# Patient Record
Sex: MALE | Race: WHITE | NOT HISPANIC OR LATINO | Employment: FULL TIME | URBAN - NONMETROPOLITAN AREA
[De-identification: names, ages, dates, MRNs, and addresses within clinical notes are randomized per-mention and may not be internally consistent; named-entity substitution may affect disease eponyms.]

---

## 2023-05-03 ENCOUNTER — OFFICE VISIT (OUTPATIENT)
Dept: PRIMARY CARE | Facility: CLINIC | Age: 48
End: 2023-05-03
Payer: COMMERCIAL

## 2023-05-03 VITALS
SYSTOLIC BLOOD PRESSURE: 122 MMHG | BODY MASS INDEX: 31.33 KG/M2 | DIASTOLIC BLOOD PRESSURE: 84 MMHG | HEART RATE: 70 BPM | WEIGHT: 231 LBS | OXYGEN SATURATION: 95 %

## 2023-05-03 DIAGNOSIS — M96.1 LUMBAR POSTLAMINECTOMY SYNDROME: ICD-10-CM

## 2023-05-03 DIAGNOSIS — Z98.1 S/P CERVICAL SPINAL FUSION: ICD-10-CM

## 2023-05-03 DIAGNOSIS — F41.1 GENERALIZED ANXIETY DISORDER: Primary | ICD-10-CM

## 2023-05-03 PROBLEM — J30.9 ALLERGIC RHINITIS: Status: ACTIVE | Noted: 2023-05-03

## 2023-05-03 PROBLEM — F33.8 SEASONAL AFFECTIVE DISORDER (CMS-HCC): Status: ACTIVE | Noted: 2023-05-03

## 2023-05-03 PROBLEM — M25.552 HIP PAIN, BILATERAL: Status: ACTIVE | Noted: 2023-05-03

## 2023-05-03 PROBLEM — M48.061 LUMBAR STENOSIS: Status: ACTIVE | Noted: 2023-05-03

## 2023-05-03 PROBLEM — G89.29 CHRONIC MUSCULOSKELETAL PAIN: Status: ACTIVE | Noted: 2023-05-03

## 2023-05-03 PROBLEM — M54.16 LUMBAR RADICULOPATHY, RIGHT: Status: ACTIVE | Noted: 2023-05-03

## 2023-05-03 PROBLEM — M25.551 HIP PAIN, BILATERAL: Status: ACTIVE | Noted: 2023-05-03

## 2023-05-03 PROBLEM — M25.569 KNEE PAIN: Status: ACTIVE | Noted: 2023-05-03

## 2023-05-03 PROBLEM — K57.90 DIVERTICULOSIS: Status: ACTIVE | Noted: 2023-05-03

## 2023-05-03 PROBLEM — M47.12 CERVICAL SPONDYLOSIS WITH MYELOPATHY: Status: ACTIVE | Noted: 2023-05-03

## 2023-05-03 PROBLEM — L72.9 SCALP CYST: Status: ACTIVE | Noted: 2023-05-03

## 2023-05-03 PROBLEM — M54.12 CERVICAL RADICULOPATHY: Status: ACTIVE | Noted: 2023-05-03

## 2023-05-03 PROBLEM — M16.11 OSTEOARTHRITIS OF RIGHT HIP: Status: ACTIVE | Noted: 2023-05-03

## 2023-05-03 PROBLEM — L72.0 EPIDERMAL INCLUSION CYST: Status: ACTIVE | Noted: 2023-05-03

## 2023-05-03 PROBLEM — L40.9 PSORIASIS: Status: ACTIVE | Noted: 2023-05-03

## 2023-05-03 PROBLEM — M79.18 CHRONIC MUSCULOSKELETAL PAIN: Status: ACTIVE | Noted: 2023-05-03

## 2023-05-03 PROBLEM — M51.26 LUMBAR HERNIATED DISC: Status: ACTIVE | Noted: 2023-05-03

## 2023-05-03 PROBLEM — M54.50 LOWER BACK PAIN: Status: ACTIVE | Noted: 2023-05-03

## 2023-05-03 PROBLEM — Q07.00 ARNOLD-CHIARI SYNDROME WITHOUT SPINA BIFIDA OR HYDROCEPHALUS (MULTI): Status: ACTIVE | Noted: 2023-05-03

## 2023-05-03 PROBLEM — F41.9 ANXIETY DISORDER: Status: ACTIVE | Noted: 2023-05-03

## 2023-05-03 PROBLEM — F17.200 CURRENT SMOKER: Status: ACTIVE | Noted: 2023-05-03

## 2023-05-03 PROBLEM — M46.1 INFLAMMATION OF SACROILIAC JOINT (CMS-HCC): Status: ACTIVE | Noted: 2023-05-03

## 2023-05-03 PROBLEM — M25.50 ARTHRALGIA OF MULTIPLE JOINTS: Status: ACTIVE | Noted: 2023-05-03

## 2023-05-03 PROCEDURE — 3008F BODY MASS INDEX DOCD: CPT | Performed by: FAMILY MEDICINE

## 2023-05-03 PROCEDURE — 99213 OFFICE O/P EST LOW 20 MIN: CPT | Performed by: FAMILY MEDICINE

## 2023-05-03 RX ORDER — CITALOPRAM 20 MG/1
20 TABLET, FILM COATED ORAL DAILY
COMMUNITY
Start: 2019-02-05 | End: 2023-05-03 | Stop reason: DRUGHIGH

## 2023-05-03 RX ORDER — CITALOPRAM 20 MG/1
30 TABLET, FILM COATED ORAL DAILY
Qty: 135 TABLET | Refills: 1 | Status: SHIPPED
Start: 2023-05-03 | End: 2024-01-09 | Stop reason: SDUPTHER

## 2023-05-03 RX ORDER — ARIPIPRAZOLE 5 MG/1
5 TABLET ORAL DAILY
COMMUNITY
Start: 2019-02-28 | End: 2024-01-09 | Stop reason: SDUPTHER

## 2023-05-03 RX ORDER — LORAZEPAM 1 MG/1
1 TABLET ORAL DAILY PRN
COMMUNITY
Start: 2013-11-20 | End: 2023-05-03 | Stop reason: SDUPTHER

## 2023-05-03 RX ORDER — OXYCODONE AND ACETAMINOPHEN 5; 325 MG/1; MG/1
TABLET ORAL
COMMUNITY
End: 2023-05-03 | Stop reason: ALTCHOICE

## 2023-05-03 RX ORDER — CYCLOBENZAPRINE HCL 10 MG
10 TABLET ORAL 3 TIMES DAILY PRN
COMMUNITY
Start: 2022-12-16 | End: 2023-08-07

## 2023-05-03 RX ORDER — LORAZEPAM 1 MG/1
1 TABLET ORAL DAILY PRN
Qty: 110 TABLET | Refills: 0 | Status: SHIPPED | OUTPATIENT
Start: 2023-05-03 | End: 2023-08-07 | Stop reason: SDUPTHER

## 2023-05-03 RX ORDER — OXYCODONE HYDROCHLORIDE 5 MG/1
5 CAPSULE ORAL 3 TIMES DAILY PRN
COMMUNITY
End: 2023-10-24 | Stop reason: SDUPTHER

## 2023-05-03 RX ORDER — NALOXONE HYDROCHLORIDE 4 MG/.1ML
4 SPRAY NASAL AS NEEDED
COMMUNITY
Start: 2021-11-15

## 2023-05-03 ASSESSMENT — ANXIETY QUESTIONNAIRES
IF YOU CHECKED OFF ANY PROBLEMS ON THIS QUESTIONNAIRE, HOW DIFFICULT HAVE THESE PROBLEMS MADE IT FOR YOU TO DO YOUR WORK, TAKE CARE OF THINGS AT HOME, OR GET ALONG WITH OTHER PEOPLE: NOT DIFFICULT AT ALL
1. FEELING NERVOUS, ANXIOUS, OR ON EDGE: NOT AT ALL
6. BECOMING EASILY ANNOYED OR IRRITABLE: NOT AT ALL
7. FEELING AFRAID AS IF SOMETHING AWFUL MIGHT HAPPEN: NOT AT ALL
GAD7 TOTAL SCORE: 1
4. TROUBLE RELAXING: NOT AT ALL
2. NOT BEING ABLE TO STOP OR CONTROL WORRYING: NOT AT ALL
3. WORRYING TOO MUCH ABOUT DIFFERENT THINGS: SEVERAL DAYS
5. BEING SO RESTLESS THAT IT IS HARD TO SIT STILL: NOT AT ALL

## 2023-05-03 NOTE — PATIENT INSTRUCTIONS
Max dose of Tylenol in 24 hours - is   1000 mg up to three times a day     You have been given  prescription(s) for a controlled medication.   This/these medications  can be dangerous if not taken exactly as directed.    You have filled out a controlled medication contract, and it is very important to abide by the contract in order for me to continue to prescribe these medications for you.  You must take these medications as directed, you cannot let anyone else take any of these medications.  You have to keep them in a safe place to protect other people or animals from getting into them.     I cannot refill them if you request a refill earlier than expected.   You cannot get refills for these medications on weekends or after office hours.   You must make sure you have a visit with me every 3 months in order for me to continue to prescribe this/these medications.   Its important you are sure to ask me if you have any questions about our policies on these medications or the medications themselves.    Please bring your bottles of any remaining medications with you to any appointment where you will be needing refills of these medications.     You can be called at any time to come in for a drug screen and/or a pill count if we feel it is necessary.   If we do call you for this, you would need to come into the office within 24 hours of our call.     PLEASE PLAN YOUR NEXT APPOINTMENT WITH ME IN   3 months          ;   ALWAYS BE SURE TO CALL AT LEAST 6 WEEKS AHEAD OF WHEN YOU NEED THE APPOINTMENT TO BE SCHEDULED.      IF I HAVE TOLD YOU TO GET LABS AHEAD OF THE APPOINTMENT WITH ME TO GO OVER TOGETHER AT OUR APPOINTMENT,  BE SURE TO MAKE A LAB APPOINTMENT A FEW DAYS AHEAD OF YOUR VISIT WITH ME, AND LEAVE ME A MESSAGE CLOSE TO THE LAB APPT DATE TO REMIND ME TO PLACE ORDERS FOR THOSE LABS.      THANK YOU!

## 2023-05-03 NOTE — PROGRESS NOTES
Subjective   Patient ID: Abdulaziz Ortez is a 48 y.o. male who presents for Follow-up (3  month med monitoring).    HPI     LOV 2/2023     Did see  new pain doc - to start on plain oxycodone 5 mg -   TID - those will start on the 10th  (replacing oxycodone/apap)         Ears are better     One concern is that he is noticing more swelling of the legs  - pitting edema       Severe generalized anxiety disorder -     Anxiety is doing well -  he was getting Lorazepam #135 for 3 mos - willing to go down to #110       Long history  of anxiety   Taking Lorazepam 1 mg hs and 1/2 in the day a few times a week - HE STATES HE GOES INTO A PANIC ATTACK IF HE SEES HE IS CLOSE TO RUNNING OUT   I have tried to wean down several times - gets MUCH worse when I do -   Also on Citalopram 30 mg a day and Aripiprazole 5 mg a day -   has been on several meds for anxiety in the past - these meds have helped him the most    Has tried propranolol and hydroxyzine - does not do well with either     I have personally reviewed the [place]ARRS report for this person. I find it to be appropriate. I have considered the risk of abuse, dependence, addiction and diversion. I believe that it is clinically appropriate for this person to be prescribed this medication for the documented diagnoses. OARRS report is in chart    Overdose risk score of 210  Contract - update 7/26/22 - updated today - electronic 5/3/23  UDS - - appropriate 7/12/22 - done with pain management (will not perform again due to cost)     JANETTE - 7 - 1 only 5/3/23    We have discussed the great risk of narcotics with benzos       For herniated disc and DDD of spine -   DR Riley - partial C3 laminectomy and C4 - 6 laminoplasty on 12/11/2020 12/16/22 - Lumbar L3- decompressive laminectomy    Has significant chronic pain upper back and low back   CANNOT HAVE STEROIDS - TERRIBLE REACTION IN THE PAST -psychiatric    (will not even try topical - OTC cortisone even effects him)     trying to eat a low  amount of sugar and gluten             Pain Management - seeing DR Gale  Use to see  DR Angie Tavares - pain management   Did see DR Knight ([facility]) - she does not feel he has psoriatic arthritis     smoking - down to 3 - 4  cig a day     Declines vaccines despite my continued encouragement         Review of Systems    Objective   /84 (BP Location: Right arm, Patient Position: Sitting, BP Cuff Size: Large adult)   Pulse 70   Wt 105 kg (231 lb)   SpO2 95%   BMI 31.33 kg/m²     Physical Exam  Vitals reviewed.   Constitutional:       General: He is not in acute distress.     Appearance: Normal appearance. He is obese.   HENT:      Head: Normocephalic and atraumatic.      Nose: Nose normal.      Mouth/Throat:      Mouth: Mucous membranes are moist.      Pharynx: No posterior oropharyngeal erythema.   Eyes:      Extraocular Movements: Extraocular movements intact.      Conjunctiva/sclera: Conjunctivae normal.      Pupils: Pupils are equal, round, and reactive to light.   Cardiovascular:      Rate and Rhythm: Normal rate and regular rhythm.      Heart sounds: Normal heart sounds. No murmur heard.  Pulmonary:      Effort: Pulmonary effort is normal. No respiratory distress.      Breath sounds: Normal breath sounds. No wheezing.   Musculoskeletal:      Cervical back: Neck supple.      Comments: Very slow gait    Lymphadenopathy:      Cervical: No cervical adenopathy.   Skin:     General: Skin is warm and dry.      Findings: No rash.   Neurological:      General: No focal deficit present.      Mental Status: He is alert.   Psychiatric:         Mood and Affect: Mood normal.         Thought Content: Thought content normal.         Assessment/Plan   Problem List Items Addressed This Visit          Other    Anxiety disorder - Primary    Relevant Medications    LORazepam (Ativan) 1 mg tablet     Doing well - no changes - but going to decrease amount of Lorazepam for 3 mos to #110  -     Urged smoking cessation again      Working with pain management

## 2023-05-05 ENCOUNTER — DOCUMENTATION (OUTPATIENT)
Dept: PRIMARY CARE | Facility: CLINIC | Age: 48
End: 2023-05-05
Payer: COMMERCIAL

## 2023-05-05 NOTE — PROGRESS NOTES
Pt had mentioned swelling of legs - altaf left when he was here for an appt  -     I did not address    I called him tonight -   Could be from sodium intake or NSAIDS  If pain when grabs calf - could be DVT - we need to know asap   If better by AM  - reassuring - may need support stockings     Asked him to think on those things and let me know

## 2023-08-03 ENCOUNTER — APPOINTMENT (OUTPATIENT)
Dept: PRIMARY CARE | Facility: CLINIC | Age: 48
End: 2023-08-03
Payer: COMMERCIAL

## 2023-08-07 ENCOUNTER — TELEPHONE (OUTPATIENT)
Dept: PRIMARY CARE | Facility: CLINIC | Age: 48
End: 2023-08-07

## 2023-08-07 ENCOUNTER — OFFICE VISIT (OUTPATIENT)
Dept: PRIMARY CARE | Facility: CLINIC | Age: 48
End: 2023-08-07
Payer: COMMERCIAL

## 2023-08-07 VITALS
BODY MASS INDEX: 32.14 KG/M2 | HEART RATE: 88 BPM | SYSTOLIC BLOOD PRESSURE: 130 MMHG | OXYGEN SATURATION: 95 % | WEIGHT: 237 LBS | DIASTOLIC BLOOD PRESSURE: 92 MMHG

## 2023-08-07 DIAGNOSIS — R39.9 LOWER URINARY TRACT SYMPTOMS (LUTS): ICD-10-CM

## 2023-08-07 DIAGNOSIS — R60.9 EDEMA, UNSPECIFIED TYPE: Primary | ICD-10-CM

## 2023-08-07 DIAGNOSIS — F41.1 GENERALIZED ANXIETY DISORDER: ICD-10-CM

## 2023-08-07 DIAGNOSIS — R06.09 DYSPNEA ON EXERTION: ICD-10-CM

## 2023-08-07 PROBLEM — R53.83 OTHER FATIGUE: Status: ACTIVE | Noted: 2023-08-07

## 2023-08-07 PROBLEM — M54.2 NECK PAIN: Status: ACTIVE | Noted: 2023-08-07

## 2023-08-07 PROBLEM — M47.816 LUMBAR FACET ARTHROPATHY: Status: ACTIVE | Noted: 2023-08-07

## 2023-08-07 LAB
ALANINE AMINOTRANSFERASE (SGPT) (U/L) IN SER/PLAS: 23 U/L (ref 10–52)
ALBUMIN (G/DL) IN SER/PLAS: 4.1 G/DL (ref 3.4–5)
ALKALINE PHOSPHATASE (U/L) IN SER/PLAS: 64 U/L (ref 33–120)
ANION GAP IN SER/PLAS: 11 MMOL/L (ref 10–20)
ASPARTATE AMINOTRANSFERASE (SGOT) (U/L) IN SER/PLAS: 16 U/L (ref 9–39)
BASOPHILS (10*3/UL) IN BLOOD BY AUTOMATED COUNT: 0.09 X10E9/L (ref 0–0.1)
BASOPHILS/100 LEUKOCYTES IN BLOOD BY AUTOMATED COUNT: 1 % (ref 0–2)
BILIRUBIN TOTAL (MG/DL) IN SER/PLAS: 0.5 MG/DL (ref 0–1.2)
CALCIUM (MG/DL) IN SER/PLAS: 9.1 MG/DL (ref 8.6–10.3)
CARBON DIOXIDE, TOTAL (MMOL/L) IN SER/PLAS: 29 MMOL/L (ref 21–32)
CHLORIDE (MMOL/L) IN SER/PLAS: 101 MMOL/L (ref 98–107)
CREATININE (MG/DL) IN SER/PLAS: 1 MG/DL (ref 0.5–1.3)
EOSINOPHILS (10*3/UL) IN BLOOD BY AUTOMATED COUNT: 0.23 X10E9/L (ref 0–0.7)
EOSINOPHILS/100 LEUKOCYTES IN BLOOD BY AUTOMATED COUNT: 2.5 % (ref 0–6)
ERYTHROCYTE DISTRIBUTION WIDTH (RATIO) BY AUTOMATED COUNT: 13.9 % (ref 11.5–14.5)
ERYTHROCYTE MEAN CORPUSCULAR HEMOGLOBIN CONCENTRATION (G/DL) BY AUTOMATED: 32.7 G/DL (ref 32–36)
ERYTHROCYTE MEAN CORPUSCULAR VOLUME (FL) BY AUTOMATED COUNT: 89 FL (ref 80–100)
ERYTHROCYTES (10*6/UL) IN BLOOD BY AUTOMATED COUNT: 5.4 X10E12/L (ref 4.5–5.9)
GFR MALE: >90 ML/MIN/1.73M2
GLUCOSE (MG/DL) IN SER/PLAS: 76 MG/DL (ref 74–99)
HEMATOCRIT (%) IN BLOOD BY AUTOMATED COUNT: 48 % (ref 41–52)
HEMOGLOBIN (G/DL) IN BLOOD: 15.7 G/DL (ref 13.5–17.5)
IMMATURE GRANULOCYTES/100 LEUKOCYTES IN BLOOD BY AUTOMATED COUNT: 0.4 % (ref 0–0.9)
LEUKOCYTES (10*3/UL) IN BLOOD BY AUTOMATED COUNT: 9.4 X10E9/L (ref 4.4–11.3)
LYMPHOCYTES (10*3/UL) IN BLOOD BY AUTOMATED COUNT: 2.53 X10E9/L (ref 1.2–4.8)
LYMPHOCYTES/100 LEUKOCYTES IN BLOOD BY AUTOMATED COUNT: 27.1 % (ref 13–44)
MONOCYTES (10*3/UL) IN BLOOD BY AUTOMATED COUNT: 0.5 X10E9/L (ref 0.1–1)
MONOCYTES/100 LEUKOCYTES IN BLOOD BY AUTOMATED COUNT: 5.3 % (ref 2–10)
NEUTROPHILS (10*3/UL) IN BLOOD BY AUTOMATED COUNT: 5.96 X10E9/L (ref 1.2–7.7)
NEUTROPHILS/100 LEUKOCYTES IN BLOOD BY AUTOMATED COUNT: 63.7 % (ref 40–80)
PLATELETS (10*3/UL) IN BLOOD AUTOMATED COUNT: 205 X10E9/L (ref 150–450)
POC APPEARANCE, URINE: CLEAR
POC BILIRUBIN, URINE: NEGATIVE
POC BLOOD, URINE: NEGATIVE
POC COLOR, URINE: YELLOW
POC GLUCOSE, URINE: NEGATIVE MG/DL
POC KETONES, URINE: NEGATIVE MG/DL
POC LEUKOCYTES, URINE: NEGATIVE
POC NITRITE,URINE: NEGATIVE
POC PH, URINE: 6 PH
POC PROTEIN, URINE: NEGATIVE MG/DL
POC SPECIFIC GRAVITY, URINE: 1.01
POC UROBILINOGEN, URINE: 0.2 EU/DL
POTASSIUM (MMOL/L) IN SER/PLAS: 4.2 MMOL/L (ref 3.5–5.3)
PROTEIN TOTAL: 7.1 G/DL (ref 6.4–8.2)
SODIUM (MMOL/L) IN SER/PLAS: 137 MMOL/L (ref 136–145)
THYROTROPIN (MIU/L) IN SER/PLAS BY DETECTION LIMIT <= 0.05 MIU/L: 1.57 MIU/L (ref 0.44–3.98)
UREA NITROGEN (MG/DL) IN SER/PLAS: 11 MG/DL (ref 6–23)

## 2023-08-07 PROCEDURE — 81003 URINALYSIS AUTO W/O SCOPE: CPT | Performed by: FAMILY MEDICINE

## 2023-08-07 PROCEDURE — 84443 ASSAY THYROID STIM HORMONE: CPT

## 2023-08-07 PROCEDURE — 80053 COMPREHEN METABOLIC PANEL: CPT

## 2023-08-07 PROCEDURE — 84153 ASSAY OF PSA TOTAL: CPT

## 2023-08-07 PROCEDURE — 99214 OFFICE O/P EST MOD 30 MIN: CPT | Performed by: FAMILY MEDICINE

## 2023-08-07 PROCEDURE — 3008F BODY MASS INDEX DOCD: CPT | Performed by: FAMILY MEDICINE

## 2023-08-07 PROCEDURE — 85025 COMPLETE CBC W/AUTO DIFF WBC: CPT

## 2023-08-07 RX ORDER — LORAZEPAM 1 MG/1
TABLET ORAL
Qty: 110 TABLET | Refills: 0 | Status: SHIPPED | OUTPATIENT
Start: 2023-08-07 | End: 2023-11-14 | Stop reason: SDUPTHER

## 2023-08-07 RX ORDER — TOPIRAMATE 25 MG/1
TABLET ORAL
COMMUNITY
Start: 2023-07-31 | End: 2023-08-07

## 2023-08-07 NOTE — PATIENT INSTRUCTIONS
Keep track of your BP - keep a log book    Make appt for an echo -   call [phone]     Plan a phone appt to be done one week after echo    ON that appt will go over Echo and BP to determine if we need to place you on BP medication  - and that may help swelling too       Keep working on quitting smoking       Keep legs as best you can  -     Try support stockings too       PLEASE PLAN YOUR NEXT APPOINTMENT WITH ME IN      3   months        ;   ALWAYS BE SURE TO CALL AT LEAST 6 WEEKS AHEAD OF WHEN YOU NEED THE APPOINTMENT TO BE SCHEDULED.      IF I HAVE TOLD YOU TO GET LABS AHEAD OF THE APPOINTMENT WITH ME TO GO OVER TOGETHER AT OUR APPOINTMENT,  BE SURE TO MAKE A LAB APPOINTMENT A FEW DAYS AHEAD OF YOUR VISIT WITH ME, AND LEAVE ME A MESSAGE CLOSE TO THE LAB APPT DATE TO REMIND ME TO PLACE ORDERS FOR THOSE LABS.      THANK YOU!

## 2023-08-07 NOTE — TELEPHONE ENCOUNTER
SPOKE TO Madina -     Told her he did discuss with me that he is changing from [facility] to [facility] due to [facility] will not fill narcotic for him     He should have enough until Friday - she will call and talk to him

## 2023-08-07 NOTE — TELEPHONE ENCOUNTER
DEVIN FROM [facility] WOULD LIKE YOU TO CALL HER.  SHE SHOWS IN OARRS HE IS GETTING LORAZAPM AT ANOTHER PHARMACY ALSO. HE IS EARLY TO FILL IT NOW.  PLEASE CALL HER AT   [phone] EXT 1.  ARE YOU AWARE THAT HE IS GETTING IT ELSEWHERE ALSO? SHOULD SHE GO AHEAD AND FILL EVEN THOUGH HE IS EARLY?

## 2023-08-07 NOTE — PROGRESS NOTES
"Subjective   Patient ID: Abdulaziz Ortez is a 48 y.o. male who presents for Follow-up (Med follow up.).    HPI     LOV 5/2023      Seeing new pain doc -   To have a trial inj of lidocaine only  -   To locate area of pain  - to possibly have \"nerves burned\"     He was given Topirimate - will not take it     Has him on pain oxycodone 5 mg - with tylenol TID           Swelling of the ankles - better by AM -                   Terrible by the end of the day  - pitting edema there by the end of the day   Not on NSAIDS     Has MOJICA     Urinating every hour  - one year   Does have dribbling at times  No dysuria   Nocturia 1 -2  times has    Drinking a lot of water -  4 - 5   16 oz bottles a day   2 cups of coffee in the AM       BP at home 128 /80s        Severe generalized anxiety disorder -      Anxiety is doing well -  he was getting Lorazepam #110 for  the last 3 mos -  would like to get this at [facility]  - does not want to go lower yet         Long history  of anxiety   Taking Lorazepam 1 mg hs and 1/2 in the day a few times a week - HE STATES HE GOES INTO A PANIC ATTACK IF HE SEES HE IS CLOSE TO RUNNING OUT   I have tried to wean down several times - gets MUCH worse when I do -   Also on Citalopram 30 mg a day and Aripiprazole 5 mg a day -   has been on several meds for anxiety in the past - these meds have helped him the most     Has tried propranolol and hydroxyzine - does not do well with either      I have personally reviewed the OARRS report for this person. I find it to be appropriate.   I have considered the risk of abuse, dependence, addiction and diversion.  I believe that it is clinically appropriate for this person to be prescribed this medication for the documented diagnoses.   OARRS report was reviewed on any day a prescription is written for a controlled substance.        Overdose risk score of 210  Contract - updated - electronic 5/3/23  UDS  - done with pain management (will not perform again due to " cost)      JANETTE - 7 - 1 only 5/3/23     We have discussed the great risk of narcotics with benzos         For herniated disc and DDD of spine -   DR Riley - partial C3 laminectomy and C4 - 6 laminoplasty on 12/11/2020 12/16/22 - Lumbar L3- decompressive laminectomy     Has significant chronic pain upper back and low back   CANNOT HAVE STEROIDS - TERRIBLE REACTION IN THE PAST -psychiatric     (will not even try topical - OTC cortisone even effects him)      trying to eat a low amount of sugar and gluten              Pain Management - seeing DR Gale  Use to see  DR Angie Tavares - pain management   Did see DR Knight ([facility]) - she does not feel he has psoriatic arthritis      smoking - down to    5  cig a day      Declines vaccines despite my continued encouragement                 Review of Systems    Objective   BP (!) 130/92 (BP Location: Right arm, Patient Position: Sitting)   Pulse 88   Wt 108 kg (237 lb)   SpO2 95%   BMI 32.14 kg/m²     Physical Exam  Vitals reviewed.   Constitutional:       General: He is not in acute distress.     Appearance: Normal appearance. He is obese. He is not ill-appearing, toxic-appearing or diaphoretic.   HENT:      Head: Normocephalic and atraumatic.   Cardiovascular:      Rate and Rhythm: Normal rate and regular rhythm.      Heart sounds: Normal heart sounds. No murmur heard.     No friction rub.   Pulmonary:      Effort: Pulmonary effort is normal.      Breath sounds: Normal breath sounds.   Musculoskeletal:         General: Swelling (1 plus edema ankles - no calf tenderness) present.   Neurological:      General: No focal deficit present.      Mental Status: He is alert and oriented to person, place, and time.         Assessment/Plan   Problem List Items Addressed This Visit          High    Anxiety disorder    Relevant Medications    LORazepam (Ativan) 1 mg tablet     Other Visit Diagnoses       Edema, unspecified type    -  Primary    Relevant Orders    CBC and Auto  Differential    TSH with reflex to Free T4 if abnormal    Transthoracic Echo (TTE) Complete    Dyspnea on exertion        Relevant Orders    Transthoracic Echo (TTE) Complete    Lower urinary tract symptoms (LUTS)        Relevant Orders    Comprehensive Metabolic Panel    POCT UA Automated manually resulted    Prostate Specific Antigen          Long history of anxiety - see above - Lorazepam refilled today    Seeing pain management     Very aware of risk of Benzo and Opioids     Issue today is the swelling of the legs -   Discussed that its likely venous insuff - and explained that -   But with MOJICA - check on Echo - gave order    And having LUTS - check labs and urine     BP running high -  to check at home    Agreed to phone appt to be done after echo -   Considering Lisinopril-hydrochlorothiazide for him     We discussed at visit any disease processes that were of concern as well as the risks, benefits and instructions of any new medication provided.    See orders and discussion section for information handed to patient on their Clinical Summary.   Patient (and/or caretaker of patient if present)  stated all questions were answered, and they voiced understanding of instructions.

## 2023-08-08 LAB — PROSTATE SPECIFIC AG (NG/ML) IN SER/PLAS: 0.89 NG/ML (ref 0–4)

## 2023-08-17 ENCOUNTER — HOSPITAL ENCOUNTER (OUTPATIENT)
Dept: DATA CONVERSION | Facility: HOSPITAL | Age: 48
End: 2023-08-17
Attending: ANESTHESIOLOGY | Admitting: ANESTHESIOLOGY
Payer: COMMERCIAL

## 2023-08-17 DIAGNOSIS — M47.816 SPONDYLOSIS WITHOUT MYELOPATHY OR RADICULOPATHY, LUMBAR REGION: ICD-10-CM

## 2023-08-29 ENCOUNTER — TELEMEDICINE (OUTPATIENT)
Dept: PRIMARY CARE | Facility: CLINIC | Age: 48
End: 2023-08-29
Payer: COMMERCIAL

## 2023-08-29 DIAGNOSIS — I10 BENIGN ESSENTIAL HYPERTENSION: Primary | ICD-10-CM

## 2023-08-29 PROCEDURE — 99212 OFFICE O/P EST SF 10 MIN: CPT | Performed by: FAMILY MEDICINE

## 2023-08-29 RX ORDER — LISINOPRIL AND HYDROCHLOROTHIAZIDE 10; 12.5 MG/1; MG/1
1 TABLET ORAL DAILY
Qty: 30 TABLET | Refills: 2 | Status: SHIPPED | OUTPATIENT
Start: 2023-08-29 | End: 2023-11-14

## 2023-08-29 NOTE — PROGRESS NOTES
Subjective   Patient ID: Abdulaziz Ortez is a 48 y.o. male who presents for Follow-up.    HPI     Virtual or Telephone Consent    A telephone visit (audio only) between the patient (at the originating site) and the provider (at the distant site) was utilized to provide this telehealth service.   Verbal consent was requested and obtained from Abdulaziz Ortez on this date, 08/30/23 for a telehealth visit.        Reviewed Echo - WNL     Bps at home -  130 - 140s    Edema a little better when cooler     Review of Systems    Objective   There were no vitals taken for this visit.    Physical Exam    NAD     Assessment/Plan   Problem List Items Addressed This Visit    None  Visit Diagnoses       Benign essential hypertension    -  Primary    Relevant Medications    lisinopriL-hydrochlorothiazide 10-12.5 mg tablet          At last appt we had discussed starting BP med -   Will do today -   Education on MED R and B     Take 1/2 if lightheaded     Pt agreed

## 2023-10-01 NOTE — OP NOTE
Post Operative Note:     PreOp Diagnosis: Lumbar facet arthropathy   Post-Procedure Diagnosis: Lumbar facet arthropathy   Procedure: 1.  Bilateral L3, L4, L5 medial branch  nerve block  2.   3.   4.   5.   Surgeon: Nicola Gale   Resident/Fellow/Other Assistant: Del Galarza   Estimated Blood Loss (mL): none   Specimen: no   Findings: N/A     Operative Report Dictated:  Dictation: not applicable - note contains Operative  Report   Operative Report:    After informed consent was obtained, the patient was brought to the procedure room and placed in the prone position.  The back area was prepped and draped in the  usual sterile fashion.  Using fluoroscopic guidance, skin and subcutaneous tissue overlying the needle trajectories to the target sites were anesthetized with 0.5% lidocaine.  25-gauge spinal needles were advanced under fluoroscopic guidance to the appropriate  anatomic landmarks.  Needle tip position was confirmed in AP and lateral views initially on the right side, then on the left side.  Injection of small amount of Omnipaque contrast at each needle tip revealed appropriate spread without vascular uptake.   Subsequently, 0.5 mL of 0.5% ropivacaine was injected at each needle tip.  The needles were removed.  The patient was then transferred to the recovery room in stable condition.  The patient will be assessed in recovery area for pain relief.     Postprocedure note  Patient had minimal relief with flexion extension and rotation after the injection.     FOLLOW UP:  Should the patient have pain relief, the patient may be a candidate for radiofrequency lesioning, one side at a time.  The patient agrees to continue currently prescribed/recommended therapies.    Attestation:   Note Completion:  I am a: Resident/Fellow   Attending Attestation I was present for the entire procedure         Electronic Signatures:  Nicola Gale)  (Signed 17-Aug-2023 12:23)   Authored: Post Operative  Note   Co-Signer: Post Operative Note, Note Completion  Del Galarza (MD (Fellow))  (Signed 17-Aug-2023 11:29)   Authored: Post Operative Note, Note Completion      Last Updated: 17-Aug-2023 12:23 by Nicola Gale)

## 2023-10-22 PROBLEM — M16.12 PRIMARY OSTEOARTHRITIS OF LEFT HIP: Status: ACTIVE | Noted: 2023-10-22

## 2023-10-22 PROBLEM — Z79.891 CHRONIC USE OF OPIATE DRUG FOR THERAPEUTIC PURPOSE: Status: ACTIVE | Noted: 2023-10-22

## 2023-10-22 PROBLEM — E66.9 CLASS 1 OBESITY WITH BODY MASS INDEX (BMI) OF 30.0 TO 30.9 IN ADULT: Status: ACTIVE | Noted: 2023-10-22

## 2023-10-22 PROBLEM — E66.811 CLASS 1 OBESITY WITH BODY MASS INDEX (BMI) OF 30.0 TO 30.9 IN ADULT: Status: ACTIVE | Noted: 2023-10-22

## 2023-10-22 PROBLEM — C44.42 SQUAMOUS CELL CARCINOMA OF SKIN OF SCALP AND NECK: Status: ACTIVE | Noted: 2021-11-01

## 2023-10-22 PROBLEM — R06.00 DYSPNEA: Status: ACTIVE | Noted: 2023-10-22

## 2023-10-22 PROBLEM — R60.9 EDEMA: Status: ACTIVE | Noted: 2023-10-22

## 2023-10-22 RX ORDER — CHLORHEXIDINE GLUCONATE ORAL RINSE 1.2 MG/ML
15 SOLUTION DENTAL DAILY
COMMUNITY

## 2023-10-22 RX ORDER — TOPIRAMATE 25 MG/1
TABLET ORAL
COMMUNITY
End: 2023-11-14

## 2023-10-24 ENCOUNTER — OFFICE VISIT (OUTPATIENT)
Dept: PAIN MEDICINE | Facility: CLINIC | Age: 48
End: 2023-10-24
Payer: COMMERCIAL

## 2023-10-24 VITALS — SYSTOLIC BLOOD PRESSURE: 153 MMHG | DIASTOLIC BLOOD PRESSURE: 104 MMHG | HEART RATE: 102 BPM | RESPIRATION RATE: 18 BRPM

## 2023-10-24 DIAGNOSIS — M47.12 CERVICAL SPONDYLOSIS WITH MYELOPATHY: ICD-10-CM

## 2023-10-24 PROCEDURE — 99213 OFFICE O/P EST LOW 20 MIN: CPT | Performed by: ANESTHESIOLOGY

## 2023-10-24 PROCEDURE — 3008F BODY MASS INDEX DOCD: CPT | Performed by: ANESTHESIOLOGY

## 2023-10-24 RX ORDER — OXYCODONE HYDROCHLORIDE 5 MG/1
5 TABLET ORAL 3 TIMES DAILY PRN
Qty: 84 TABLET | Refills: 0 | Status: SHIPPED | OUTPATIENT
Start: 2024-01-11 | End: 2024-01-23 | Stop reason: SDUPTHER

## 2023-10-24 RX ORDER — OXYCODONE HYDROCHLORIDE 5 MG/1
5 TABLET ORAL 3 TIMES DAILY PRN
Qty: 84 TABLET | Refills: 0 | Status: SHIPPED | OUTPATIENT
Start: 2023-12-12 | End: 2023-10-25 | Stop reason: SDUPTHER

## 2023-10-24 RX ORDER — OXYCODONE HYDROCHLORIDE 5 MG/1
5 CAPSULE ORAL 3 TIMES DAILY PRN
Qty: 84 CAPSULE | Refills: 0 | Status: SHIPPED | OUTPATIENT
Start: 2023-11-12 | End: 2023-10-25 | Stop reason: CLARIF

## 2023-10-24 ASSESSMENT — PAIN SCALES - GENERAL
PAINLEVEL_OUTOF10: 5 - MODERATE PAIN
PAINLEVEL: 5

## 2023-10-24 ASSESSMENT — LIFESTYLE VARIABLES: TOTAL SCORE: 1

## 2023-10-24 ASSESSMENT — ENCOUNTER SYMPTOMS
DEPRESSION: 0
LOSS OF SENSATION IN FEET: 0
OCCASIONAL FEELINGS OF UNSTEADINESS: 0

## 2023-10-24 ASSESSMENT — PAIN - FUNCTIONAL ASSESSMENT: PAIN_FUNCTIONAL_ASSESSMENT: 0-10

## 2023-10-24 ASSESSMENT — PAIN DESCRIPTION - DESCRIPTORS: DESCRIPTORS: SHARP;SHOOTING

## 2023-10-24 NOTE — PROGRESS NOTES
Subjective   Patient ID: Abdulaziz Ortez is a 48 y.o. male.    HPI  Patient is a 39 [place] male who presents as a follow-up visit in pain clinic today for neck and back pain.  Patient states that the neck pain is associated with shooting stabbing pain down both arms into both hands.  This is aggravated at work when he is reaching above his head.  He also endorses axial low back pain with shooting pain down both anterior thighs that is associated with a heaviness feeling.     Review of Systems  A 13 point comprehensive review of system was negative except for specific complaints as listed in the HPI.    Objective   Physical Exam  PHYSICAL EXAM  Vitals signs reviewed  Constitutional:    General: Not in acute distress   Appearance: Normal appearance. Not ill-appearing.  HENT:   Head: Normocephalic and atraumatic  Eyes:   Conjunctiva/sclera normal  Cardiovascular:  No jugular venous distention bilaterally  No gross edema in lower extremities  Pulmonary:   Effort: No respiratory distress  Abdominal:  Abdomen appears nondistended  Musculoskeletal:   Moves all extremities equally  Skin:   General: Skin is warm and dry  Neurological:  Stern was negative bilaterally  Spurling sign was positive   strength was intact bilaterally  Straight leg test was positive bilaterally  Patellar reflexes were 2+ bilaterally  Psychiatric:    Mood and Affect: Mood normal    Behavior: Behavior normal    Assessment/Plan   Diagnoses and all orders for this visit:  Cervical spondylosis with myelopathy  -     MR cervical spine wo IV contrast; Future      Patient is a 48-year-old male with a past medical history significant for lumbar discectomy as well as posterior spinal instrumentation, who now presents with both cervical radiculopathy as well as lumbar radiculopathy.  L-spine MRI reviewed, notable for severe central stenosis at L3/L4 with bilateral moderate neuroforaminal stenosis as well as moderate/severe central stenosis at L4/L5.   Patient only has plain films available to review for C-spine, which does reveal intact hardware.  Recommending patient get C-spine MRI to reevaluate for new history of weakness and heaviness in his upper extremities.  Patient completed 8 weeks of physical therapy and is active in daily at home physician directed exercises.  Of note, patient had severe delirium and psychosis induced from p.o. steroids, and is part of community insurance program limiting reimbursement of interventions.    Plan:  - Order C-spine MRI  - Recommending surgical consultation for possible C-spine decompression.   I saw and evaluated the patient. I personally obtained the key and critical portions of the history and physical exam or was physically present for key and critical portions performed by the resident/fellow. I reviewed the resident/fellow's documentation and discussed the patient with the resident/fellow. I agree with the resident/fellow's medical decision making as documented in the note.    Nicola Gale MD

## 2023-10-24 NOTE — ASSESSMENT & PLAN NOTE
MRI of cervical spine  Based on results will refer patient for spine surgery for reevaluation of cervical spine.

## 2023-10-25 DIAGNOSIS — M47.12 CERVICAL SPONDYLOSIS WITH MYELOPATHY: ICD-10-CM

## 2023-10-25 RX ORDER — OXYCODONE HYDROCHLORIDE 5 MG/1
5 TABLET ORAL 3 TIMES DAILY PRN
Qty: 84 TABLET | Refills: 0 | Status: SHIPPED | OUTPATIENT
Start: 2023-11-12 | End: 2023-11-14 | Stop reason: SDUPTHER

## 2023-10-26 DIAGNOSIS — M54.16 LUMBAR RADICULOPATHY, RIGHT: ICD-10-CM

## 2023-10-26 DIAGNOSIS — M96.1 LUMBAR POSTLAMINECTOMY SYNDROME: ICD-10-CM

## 2023-10-26 DIAGNOSIS — M51.26 LUMBAR HERNIATED DISC: ICD-10-CM

## 2023-10-26 RX ORDER — OXYCODONE HYDROCHLORIDE 5 MG/1
5 TABLET ORAL 3 TIMES DAILY PRN
Qty: 84 TABLET | Refills: 0 | Status: SHIPPED | OUTPATIENT
Start: 2023-12-12 | End: 2023-11-14 | Stop reason: SDUPTHER

## 2023-11-09 ENCOUNTER — HOSPITAL ENCOUNTER (OUTPATIENT)
Dept: RADIOLOGY | Facility: HOSPITAL | Age: 48
Discharge: HOME | End: 2023-11-09
Payer: COMMERCIAL

## 2023-11-09 DIAGNOSIS — M47.12 CERVICAL SPONDYLOSIS WITH MYELOPATHY: ICD-10-CM

## 2023-11-09 PROCEDURE — 72141 MRI NECK SPINE W/O DYE: CPT | Performed by: RADIOLOGY

## 2023-11-09 PROCEDURE — 72141 MRI NECK SPINE W/O DYE: CPT

## 2023-11-14 ENCOUNTER — OFFICE VISIT (OUTPATIENT)
Dept: PRIMARY CARE | Facility: CLINIC | Age: 48
End: 2023-11-14
Payer: COMMERCIAL

## 2023-11-14 VITALS
OXYGEN SATURATION: 97 % | BODY MASS INDEX: 32.25 KG/M2 | WEIGHT: 237.8 LBS | HEART RATE: 91 BPM | DIASTOLIC BLOOD PRESSURE: 88 MMHG | SYSTOLIC BLOOD PRESSURE: 140 MMHG

## 2023-11-14 DIAGNOSIS — M51.26 LUMBAR HERNIATED DISC: ICD-10-CM

## 2023-11-14 DIAGNOSIS — I10 BENIGN ESSENTIAL HYPERTENSION: Primary | ICD-10-CM

## 2023-11-14 DIAGNOSIS — M47.12 CERVICAL SPONDYLOSIS WITH MYELOPATHY: ICD-10-CM

## 2023-11-14 DIAGNOSIS — F41.1 GENERALIZED ANXIETY DISORDER: ICD-10-CM

## 2023-11-14 DIAGNOSIS — M54.16 LUMBAR RADICULOPATHY, RIGHT: ICD-10-CM

## 2023-11-14 DIAGNOSIS — M96.1 LUMBAR POSTLAMINECTOMY SYNDROME: ICD-10-CM

## 2023-11-14 DIAGNOSIS — Q07.00 ARNOLD-CHIARI SYNDROME WITHOUT SPINA BIFIDA OR HYDROCEPHALUS (MULTI): ICD-10-CM

## 2023-11-14 PROCEDURE — 3077F SYST BP >= 140 MM HG: CPT | Performed by: FAMILY MEDICINE

## 2023-11-14 PROCEDURE — 3079F DIAST BP 80-89 MM HG: CPT | Performed by: FAMILY MEDICINE

## 2023-11-14 PROCEDURE — 3008F BODY MASS INDEX DOCD: CPT | Performed by: FAMILY MEDICINE

## 2023-11-14 PROCEDURE — 99214 OFFICE O/P EST MOD 30 MIN: CPT | Performed by: FAMILY MEDICINE

## 2023-11-14 RX ORDER — LORAZEPAM 1 MG/1
TABLET ORAL
Qty: 100 TABLET | Refills: 0 | Status: SHIPPED | OUTPATIENT
Start: 2023-11-14 | End: 2024-02-15 | Stop reason: SDUPTHER

## 2023-11-14 RX ORDER — LOSARTAN POTASSIUM 25 MG/1
25 TABLET ORAL DAILY
Qty: 30 TABLET | Refills: 2 | Status: SHIPPED | OUTPATIENT
Start: 2023-11-14 | End: 2024-02-08 | Stop reason: SDUPTHER

## 2023-11-14 RX ORDER — HYDROCHLOROTHIAZIDE 12.5 MG/1
12.5 TABLET ORAL DAILY
Qty: 30 TABLET | Refills: 2 | Status: SHIPPED | OUTPATIENT
Start: 2023-11-14 | End: 2024-02-08 | Stop reason: SDUPTHER

## 2023-11-14 NOTE — PATIENT INSTRUCTIONS
Hypertension Reminders:    IF YOU ARE A PERSON WHOSE BLOOD PRESSURE RUNS HIGH IN THE DOCTOR'S OFFICE,  THEN WE NEED TO VERIFY YOUR CUFF AT LEAST  ONCE YEARLY.   ALWAYS BRING CUFF WITH YOU TO ANY HYPERTENSION CHECK UP APPOINTMENT.  WE CAN RECORD YOUR BP  FROM HOME THE DAY OF THE APPOINTMENT - BUT WE HAVE TO SEE IT ON YOUR MACHINE.      To accurately check your blood pressure -  be sure to sit and relax for 5 minutes, you need your back supported, feet flat on the ground, arm heart level and relaxed.    Generally speaking, well controlled hypertension is below 130/80   for  most people  and if you are over 75, below 140/90  is acceptable.    Please take your medication as directed, and if you forget a dose  DO NOT DOUBLE THE DOSE THE NEXT DAY, just take is as you normally would.     It is important to stay on a low sodium diet :  1500 - 2000 mg of sodium a day  -  it is important to read labels.    Regular Exercise is very important as well.  Always gradually increase your exercise regimen.  Your goal is 30 minutes of a good cardiovascular exercise at least 5 days a week.    IF YOUR BMI (BODY MASS INDEX) IS OVER 25, LOSING WEIGHT WILL HELP CONTROL YOUR BLOOD PRESSURE.   Talk to me further if you need help doing this.        It is very important NOT to smoke  or use any tobacco products.  Talk to me about options if you want help quitting.    It is very important to keep your alcohol in take low.   Generally speaking, adult men should not drink more than 2 regular size beers a day, or no more than 2 ounces of liquor, or no more than 12 ounces of wine.  For adult women - the recommendations are half that.    BUT , THIS IS NOT UNIVERSAL   - be sure to ask me if alcohol is safe for you to drink, and if so, the acceptable amount.        You have been given  prescription(s) for a controlled medication.   This/these medications  can be dangerous if not taken exactly as directed.    You have filled out a controlled medication  "contract, and it is very important to abide by the contract in order for me to continue to prescribe these medications for you.  You must take these medications as directed, you cannot let anyone else take any of these medications.  You have to keep them in a safe place to protect other people or animals from getting into them.     I cannot refill them if you request a refill earlier than expected.   You cannot get refills for these medications on weekends or after office hours.   You must make sure you have a visit with me every 3 months in order for me to continue to prescribe this/these medications.   Its important you are sure to ask me if you have any questions about our policies on these medications or the medications themselves.    Please bring your bottles of any remaining medications with you to any appointment where you will be needing refills of these medications.     You can be called at any time to come in for a drug screen and/or a pill count if we feel it is necessary.   If we do call you for this, you would need to come into the office within 24 hours of our call.         For General Healthy Nutrition    (Remember - NOT A DIET!   Diets are only good for class reunions.)    These are my general good nutrition recommendations for most people.   I use the term \" diet \"  in these instructions to mean your overall nutrition - how you eat and drink.   If we talked about something different during your visit with me,  other than what is written below,  follow that advice instead.       For most people,  eating healthier means getting less added sugar and less processed foods in your diet    The fresher the better.    Added sugar is now a part of the nutrition label on manufactured food, so you can keep an eye on it easier.    But basically,  foods and beverages  that contain regular sugar and corn syrup are the main sources of added sugars.  Eating as little of these foods as you can is best.   One shocking " example of the epidemic of added sugar is soda.    One can of regular soda contains about as much added sugar as 3 regular size doughnuts!     The other issue with processed foods is the amount of processed grains they contain , such as white flour.    This is also something you want to try to limit in your diet.     But, grain products are very important for your nutrition.    Whole grains are better for your body.     Cutting back on white breads, traditional pasta, baked goods, white rice,  and processed cereals will be healthier for you.   The better choices include whole grain breads,  whole wheat pasta,  brown rice, quinoa, barley, steel cut  or rolled oats.   If you eat cereal for breakfast, try to look for one made with whole grains and less sugar.   There are many people who have a problem with gluten, for a large variety of reasons.    Generally,  products made with wheat flour , barley or rye are the primary source of gluten.       Cutting back on saturated fats is important.    You want the majority of the meat that you eat to be chicken, fish or turkey.   Baked or broiled is best -  fried adds too much fat.    There are healthy fats that are important - fat is important for holding down appetite, vitamin absorption and several metabolic processes in the body.  Monounsaturated fats raise HDL (good cholesterol) and lower LDL (bad cholesterol).   Olive oil, peanut oil, nuts, seeds, and avocados are great sources of the good fats.       Ideas are:   Trade sour cream dip for hummus (which is rich in olive oil) or guacamole; use veggies or whole-wheat chips to dip.    Nuts are an excellent source of protein and healthy fats.   Tree nuts are the best kind, such as almonds or walnuts.   Just be careful - they are high in calories, so stick to a serving size.  (Most are about 200 calories for a 1/4 cup)      Proteins are very important for your body, and they also hold down your appetite.   Try to have protein  "with every meal.    These generally are meats, nuts, many beans, legumes, eggs, and dairy.   You will find protein in whole grain products and some green vegetables have a little too.     When you have dairy (if you can - many people are lactose intolerant) try to make it low fat.    Ideas are 1% milk, lowfat yogurt or cheeses, low fat cottage cheese.   I don't generally recommend FAT FREE because they often contain artificial products to improve taste, and the fat helps hold down your appetite.   If you are lactose intolerant, try to see if taking Lactaid before having dairy helps.      Fresh fruits and vegetables are VERY important.  The brighter the better.   Many vegetables are considered \"Free Foods\" - meaning you can eat as much as you want, and it does not matter.  These include tomatoes, cucumbers, celery, peppers, all the various lettuces and kale - to name a few.   Potatoes, corn and peas are starchy, so do have more calories, but are still healthy - you just want to watch the amount of them you eat.       Fruits are full of wonderful nutrition.   They contain natural sugar called fructose, so eating them in moderation is best.   Diabetics may need to pay careful attention to how their body reacts to the sugar.  Some fruits might drastically increase their blood sugar.      Eating smaller meals with a couple of small snacks is better for your metabolism than not eating for long amounts of time  (breakfast is very important).   Trying to avoid large meals is helpful too.    Eating like this helps keep your appetite down and keeps you in burning metabolism rather than storage metabolism so your body will use the calories you eat.       I do not tell people to stop eating sweets or snack foods - just limit the amounts you have.  The less the better.   Pay attention to serving sizes, and treat them as a treat.        Foods like doughnuts, pop tarts, sugar cereals, cookies  ARE NOT GOOD FOR BREAKFAST.   They " are loaded with sugar and will cause you to be hungrier in the day and often not feel well.    Caffeine needs to be limited - no more than 2 servings a day.  Some people can't have any at all.    (if you have any sleep or anxiety issues - stop the caffeine)   Coffee, many teas, many sodas, energy drinks, almost any diet supplement,  and chocolate all contain caffeine.      Water is important.   For most people, 8   x  8 ounces  a day are needed.  This may vary for some health issues.    If you need to be on a low sodium diet, that means looking at labels and eating only 1000 - 2000 mg of sodium a day.    Calcium intake is important.  3 servings of a high calcium food or drink a day is recommended.   This is usually a cup of milk, a cup of yogurt, an ounce of a hard cheese or 1.5 ounces of a soft cheese are the usual servings.   There are other high calcium foods - including soy or almond milk, broccoli,  almonds, dark green leafy vegetable.   Make sure you are not getting more than 1000  - 1200 mg of total calcium a day (unless you have been told you need more by a doctor).    Vitamin D 3 is important to absorb the calcium and for your immune system.   For children, 400 IU a day is recommended.   For adults - 800 - 5000 IU a day  is recommended.  (Often the amount needed is individualized for adults - be sure to ask how much is right for you)    Physical activity is very important for good health.    Finding activities that give you regular exercise is very important for good health.  Try to find exercise you enjoy doing on a regular basis.    30 minutes at least 5 days a week of a good cardiovascular exercise is recommended.   That means something that gets your heart rate going faster than your usual baseline and you can find yourself breathing harder than usual while you are exercising.  If you have not done any exercise in a long time,  make sure you ask if its safe for you to start,  and be sure to gradually  work up to your goal.      If you need to lose weight,  following these recommendations will help you.   And if you are doing all of this and still not losing weight, then its likely just the amount of food you are eating.   Learn to cut back on portion sizes.  Using smaller plates may help.  Healthy weight loss is  only about a pound a week.   You have to remember that whatever you do to lose the weight, you must be prepared to keep it up for life for the weight to stay off.     A lot of people have a lot of luck with using something like a fit bit,  or a program where you keep track of all of your calories that you eat and what you burn off in the day.

## 2023-11-14 NOTE — PROGRESS NOTES
Subjective   Patient ID: Abdulaziz Ortez is a 48 y.o. male who presents for Follow-up.    HPI     LOV 8/2023               BP elevated and some swelling -                 Echo and labs done - not bad                Started him on Lisinopril/hydrochlorothiazide  -   HE DID NOT TAKE IT  - he was afraid of it     He is tracking BP  - 130's usually   Swelling is better at home - not at work     Had an MRI of his neck  - to have appt to go over it     Seeing pain management  -   Had a trial inj of lidocaine only  -   No help     He was given Topirimate - will not take it     Has him on pain oxycodone 5 mg - with tylenol TID                           Urinating every hour  - one year   Does have dribbling at times  No dysuria   Nocturia 1 -2  times has  Does not want medication now     Drinking less water - no change        Severe generalized anxiety disorder -      Anxiety is doing well -  he was getting Lorazepam #110 for  the last 3 mos -  would like to get this at [facility]  -       He is willing to try less  - willing to try #100 for the 3 mos       Long history  of anxiety   Taking Lorazepam 1 mg hs , and occas another in the day   HE STATES HE GOES INTO A PANIC ATTACK IF HE SEES HE IS CLOSE TO RUNNING OUT   I have tried to wean down several times - gets MUCH worse when I do -     Also on Citalopram 30 mg a day and     Aripiprazole 5 mg a day -   has been on several meds for anxiety in the past - these meds have helped him the most       Has tried propranolol and hydroxyzine - does not do well with either      I have personally reviewed the OARRS report for this person. I find it to be appropriate.   I have considered the risk of abuse, dependence, addiction and diversion.  I believe that it is clinically appropriate for this person to be prescribed this medication for the documented diagnoses.   OARRS report was reviewed on any day a prescription is written for a controlled substance.        Overdose risk score of  290  Contract - updated - electronic 5/3/23  UDS  - done with pain management (will not perform again due to cost)      JANETTE - 7 - 1 only 5/3/23     We have discussed the great risk of narcotics with benzos         For herniated disc and DDD of spine -   DR Riley - partial C3 laminectomy and C4 - 6 laminoplasty on 12/11/2020 12/16/22 - Lumbar L3- decompressive laminectomy     Has significant chronic pain upper back and low back   CANNOT HAVE STEROIDS - TERRIBLE REACTION IN THE PAST -psychiatric     (will not even try topical - OTC cortisone even effects him)      trying to eat a low amount of sugar and gluten              Pain Management - seeing DR Gale  Use to see  DR Angie Tavares - pain management   Did see DR Knight ([facility]) - she does not feel he has psoriatic arthritis   (Does have psoriasis)      smoking - down to  5  cig a day  -     About the same      Declines vaccines despite my continued encouragement       Review of Systems    Objective   /88   Pulse 91   Wt 108 kg (237 lb 12.8 oz)   SpO2 97%   BMI 32.25 kg/m²     Physical Exam  Vitals reviewed.   Constitutional:       General: He is not in acute distress.     Appearance: Normal appearance. He is obese. He is not ill-appearing, toxic-appearing or diaphoretic.   HENT:      Head: Normocephalic and atraumatic.   Cardiovascular:      Rate and Rhythm: Normal rate and regular rhythm.      Heart sounds: Normal heart sounds. No murmur heard.     No friction rub.   Pulmonary:      Effort: Pulmonary effort is normal.      Breath sounds: Normal breath sounds.   Musculoskeletal:         General: No swelling (1 plus edema ankles - no calf tenderness).   Neurological:      General: No focal deficit present.      Mental Status: He is alert and oriented to person, place, and time.         Assessment/Plan   Problem List Items Addressed This Visit          High    Anxiety disorder    Relevant Medications    LORazepam (Ativan) 1 mg tablet       Medium    Benign  essential hypertension - Primary    Relevant Medications    losartan (Cozaar) 25 mg tablet    hydroCHLOROthiazide (HYDRODiuril) 12.5 mg tablet   Long history of anxiety - see above - Lorazepam refilled today- at #100    Seeing pain management     Very aware of risk of Benzo and Opioids     HTN - intermittent edema   Try low dose Losartan and hydrochlorothiazide     And having LUTS - did not want med       We discussed at visit any disease processes that were of concern as well as the risks, benefits and instructions of any new medication provided.    See orders and discussion section for information handed to patient on their Clinical Summary.   Patient (and/or caretaker of patient if present)  stated all questions were answered, and they voiced understanding of instructions.

## 2023-11-15 RX ORDER — OXYCODONE HYDROCHLORIDE 5 MG/1
5 TABLET ORAL 3 TIMES DAILY PRN
Qty: 84 TABLET | Refills: 0 | Status: SHIPPED | OUTPATIENT
Start: 2023-11-15

## 2023-11-15 RX ORDER — OXYCODONE HYDROCHLORIDE 5 MG/1
5 TABLET ORAL 3 TIMES DAILY PRN
Qty: 84 TABLET | Refills: 0 | Status: SHIPPED | OUTPATIENT
Start: 2023-12-12

## 2023-11-27 ENCOUNTER — TELEMEDICINE (OUTPATIENT)
Dept: PAIN MEDICINE | Facility: CLINIC | Age: 48
End: 2023-11-27
Payer: COMMERCIAL

## 2023-11-27 DIAGNOSIS — M54.12 CERVICAL RADICULOPATHY: Primary | ICD-10-CM

## 2023-11-27 PROCEDURE — 99442 PR PHYS/QHP TELEPHONE EVALUATION 11-20 MIN: CPT | Performed by: ANESTHESIOLOGY

## 2023-11-27 NOTE — PROGRESS NOTES
Is a virtual visit conducted through a telephone call.  Duration of visit is 11 minutes  History Of Present Illness  Abdulaziz Ortez is a 48 y.o. male presenting with neck pain radiating to upper extremities bilaterally.  Patient describes his pain as sharp shooting in nature that radiates from the anterior aspect of upper extremities all the way down to the thumb and index fingers.  Pain associated with the numbness but no weakness.  No history of any gait imbalance.  Today I discussed the results of the MRI of the cervical spine that showed history of C4-C6 laminectomy with fixation.  There was no significant canal stenosis noted however patient has cerebellar tonsillar ptosis with a hydromyelia consistent with Chiari malformation.  Patient is not a candidate for cervical epidural steroid injection due to the side effects from steroids.  Patient also was not able to tolerate gabapentin and Topamax due to side effects.  At this point I offered the patient to continue oxycodone and neck strengthening exercises.    Past Medical History  He has a past medical history of Acute gastritis without bleeding (02/06/2019), Diverticulitis of intestine, part unspecified, without perforation or abscess without bleeding (03/09/2017), and Personal history of other specified conditions (04/28/2020).    Surgical History  He has a past surgical history that includes Ankle surgery (11/20/2013); Mouth surgery (11/20/2013); and Other surgical history (03/23/2021).     Social History  He reports that he has been smoking cigarettes. He has been smoking an average of .25 packs per day. He has never used smokeless tobacco. He reports that he does not drink alcohol and does not use drugs.    Family History  Family History   Problem Relation Name Age of Onset    Genetic Disorder Mother          cardiomyopathy        Allergies  Prednisone and Telithromycin    Review of systems:   13-point review of systems done and negative except as noted in  HPI      Physical Exam   V     Last Recorded Vitals  There were no vitals taken for this visit.    Relevant Results               Assessment/Plan   There are no diagnoses linked to this encounter.  Cervical radiculitis    Plan  Oxycodone 5 mg 1 tablet 3 times daily  Continue neck muscle strengthening exercises       Nicola Gale MD

## 2024-01-09 DIAGNOSIS — F41.1 GENERALIZED ANXIETY DISORDER: ICD-10-CM

## 2024-01-09 RX ORDER — ARIPIPRAZOLE 5 MG/1
5 TABLET ORAL DAILY
Qty: 90 TABLET | Refills: 1 | Status: SHIPPED | OUTPATIENT
Start: 2024-01-09 | End: 2024-02-15 | Stop reason: SDUPTHER

## 2024-01-09 RX ORDER — CITALOPRAM 20 MG/1
30 TABLET, FILM COATED ORAL DAILY
Qty: 135 TABLET | Refills: 1 | Status: SHIPPED | OUTPATIENT
Start: 2024-01-09 | End: 2024-01-11

## 2024-01-09 RX ORDER — LORAZEPAM 1 MG/1
TABLET ORAL
Qty: 100 TABLET | Refills: 0 | OUTPATIENT
Start: 2024-01-09 | End: 2024-04-10

## 2024-01-11 DIAGNOSIS — F41.1 GENERALIZED ANXIETY DISORDER: ICD-10-CM

## 2024-01-11 RX ORDER — CITALOPRAM 20 MG/1
TABLET, FILM COATED ORAL
Qty: 135 TABLET | Refills: 3 | Status: SHIPPED | OUTPATIENT
Start: 2024-01-11 | End: 2024-02-15 | Stop reason: SDUPTHER

## 2024-01-23 ENCOUNTER — OFFICE VISIT (OUTPATIENT)
Dept: PAIN MEDICINE | Facility: CLINIC | Age: 49
End: 2024-01-23
Payer: COMMERCIAL

## 2024-01-23 VITALS — SYSTOLIC BLOOD PRESSURE: 138 MMHG | RESPIRATION RATE: 20 BRPM | HEART RATE: 92 BPM | DIASTOLIC BLOOD PRESSURE: 90 MMHG

## 2024-01-23 DIAGNOSIS — M96.1 LUMBAR POSTLAMINECTOMY SYNDROME: Primary | ICD-10-CM

## 2024-01-23 DIAGNOSIS — M47.12 CERVICAL SPONDYLOSIS WITH MYELOPATHY: ICD-10-CM

## 2024-01-23 PROCEDURE — 99213 OFFICE O/P EST LOW 20 MIN: CPT | Performed by: ANESTHESIOLOGY

## 2024-01-23 PROCEDURE — 3080F DIAST BP >= 90 MM HG: CPT | Performed by: ANESTHESIOLOGY

## 2024-01-23 PROCEDURE — 3075F SYST BP GE 130 - 139MM HG: CPT | Performed by: ANESTHESIOLOGY

## 2024-01-23 RX ORDER — OXYCODONE HYDROCHLORIDE 5 MG/1
5 TABLET ORAL 3 TIMES DAILY PRN
Qty: 84 TABLET | Refills: 0 | Status: SHIPPED | OUTPATIENT
Start: 2024-03-07 | End: 2024-01-31 | Stop reason: SDUPTHER

## 2024-01-23 RX ORDER — OXYCODONE HYDROCHLORIDE 5 MG/1
5 TABLET ORAL 3 TIMES DAILY PRN
Qty: 84 TABLET | Refills: 0 | Status: SHIPPED | OUTPATIENT
Start: 2024-02-08 | End: 2024-01-31 | Stop reason: SDUPTHER

## 2024-01-23 RX ORDER — OXYCODONE HYDROCHLORIDE 5 MG/1
5 TABLET ORAL 3 TIMES DAILY PRN
Qty: 84 TABLET | Refills: 0 | Status: SHIPPED | OUTPATIENT
Start: 2024-04-04 | End: 2024-01-31 | Stop reason: SDUPTHER

## 2024-01-23 ASSESSMENT — ENCOUNTER SYMPTOMS
LOSS OF SENSATION IN FEET: 0
OCCASIONAL FEELINGS OF UNSTEADINESS: 0
DEPRESSION: 0

## 2024-01-23 ASSESSMENT — PAIN SCALES - GENERAL: PAINLEVEL_OUTOF10: 5 - MODERATE PAIN

## 2024-01-23 ASSESSMENT — PAIN - FUNCTIONAL ASSESSMENT: PAIN_FUNCTIONAL_ASSESSMENT: 0-10

## 2024-01-23 ASSESSMENT — PAIN DESCRIPTION - DESCRIPTORS: DESCRIPTORS: ACHING;SHOOTING;RADIATING

## 2024-01-23 NOTE — PROGRESS NOTES
History Of Present Illness  Abdulaizz Ortez is a 48 y.o. male history of neck postlaminectomy syndrome presenting with neck pain radiating to upper extremities bilaterally associated with numbness but no weakness.  Right lumbosacral spine that showed a C4- C6 5 laminectomy without any significant canal stenosis.  In addition patient complains low back pain and was diagnosed as a lumbar postlaminectomy syndrome.  Past medical history significant for cerebral tonsillar ptosis with hydromyelia consistent with the Chiari malformation.  Patient is not a candidate for any steroid injections due to severe side effects from steroids.  In addition patient has failed multiple neuromodulators due to side effects.  Current pain medications include oxycodone 5 mg 1 tablet 3 times daily and Tylenol extra strength 2 tablets 3 times daily.  Patient reports about 50% relief with the current regimen.  Today patient has no new complaints and is here for follow-up visit for renewal of his current pain medications     Past Medical History  He has a past medical history of Acute gastritis without bleeding (02/06/2019), Diverticulitis of intestine, part unspecified, without perforation or abscess without bleeding (03/09/2017), and Personal history of other specified conditions (04/28/2020).    Surgical History  He has a past surgical history that includes Ankle surgery (11/20/2013); Mouth surgery (11/20/2013); and Other surgical history (03/23/2021).     Social History  He reports that he has been smoking cigarettes. He has been smoking an average of .25 packs per day. He has never used smokeless tobacco. He reports that he does not drink alcohol and does not use drugs.    Family History  Family History   Problem Relation Name Age of Onset    Genetic Disorder Mother          cardiomyopathy        Allergies  Prednisone and Telithromycin    Review of systems:   13-point review of systems done and negative except as noted in HPI      Physical  Exam   Vital signs: Reviewed  Constitutional: No acute distress, well appearing and well nourished. Patient appears stated age.   Eyes: Conjunctiva non-icteric and eye lids are without obvious rash or drooping. Pupils are symmetric.   Ears, Nose, Mouth, and Throat: External ears and nose appear to be without deformity or rash. No lesions or masses noted. Hearing is grossly intact.   Neck:. No JVD noted, tracheal position is midline.   Head and Face: Examination of the head and face revealed no abnormalities.   Respiratory: No gasping or shortness of breath noted, no use of accessory muscles noted.   Cardiovascular: Examination for edema is normal.   GI: Abdomen nontender to palpation.   Skin: No rashes or open lesions/ulcers identified on skin.   Musk: Digits/nails show no clubbing or cyanosis. No asymmetry or masses noted of the musculature. Examination of the muscles/joints/bones show normal range of motion. Gait is grossly normal.  Able to walk on toes and heels.   Neurologic: Cranial nerves II-XII intact, motor strength 5/5 muscle strength of the lower extremities bilaterally and equal. 5/5 muscle strength of the upper extremities bilaterally and equal.   Reflexes: normal.   Sensation: Normal to touch and pinprick in lower extremities bilaterally         Last Recorded Vitals  /90   Pulse 92   Resp 20     Relevant Results            Last OARRS Review: No data recorded    I have personally reviewed the OARRS report for Abdulaziz Ortez I have considered the risks of abuse, dependence, addiction and diversion       Assessment/Plan   Diagnoses and all orders for this visit:  Cervical spondylosis with myelopathy      Plan  Renew oxycodone 5 mg 1 tablet 3 times daily  Tylenol Extra Strength 2 tablets 3 times daily       Nicola Gale MD

## 2024-01-30 ENCOUNTER — APPOINTMENT (OUTPATIENT)
Dept: PAIN MEDICINE | Facility: CLINIC | Age: 49
End: 2024-01-30
Payer: COMMERCIAL

## 2024-01-31 DIAGNOSIS — M47.12 CERVICAL SPONDYLOSIS WITH MYELOPATHY: ICD-10-CM

## 2024-02-01 RX ORDER — OXYCODONE HYDROCHLORIDE 5 MG/1
5 TABLET ORAL 3 TIMES DAILY PRN
Qty: 84 TABLET | Refills: 0 | Status: SHIPPED | OUTPATIENT
Start: 2024-03-07 | End: 2024-04-30 | Stop reason: SDUPTHER

## 2024-02-01 RX ORDER — OXYCODONE HYDROCHLORIDE 5 MG/1
5 TABLET ORAL 3 TIMES DAILY PRN
Qty: 84 TABLET | Refills: 0 | Status: SHIPPED | OUTPATIENT
Start: 2024-04-04 | End: 2024-04-16 | Stop reason: SDUPTHER

## 2024-02-01 RX ORDER — OXYCODONE HYDROCHLORIDE 5 MG/1
5 TABLET ORAL 3 TIMES DAILY PRN
Qty: 84 TABLET | Refills: 0 | Status: SHIPPED | OUTPATIENT
Start: 2024-02-08 | End: 2024-04-30 | Stop reason: SDUPTHER

## 2024-02-08 DIAGNOSIS — I10 BENIGN ESSENTIAL HYPERTENSION: ICD-10-CM

## 2024-02-08 RX ORDER — LOSARTAN POTASSIUM 25 MG/1
25 TABLET ORAL DAILY
Qty: 30 TABLET | Refills: 2 | Status: SHIPPED | OUTPATIENT
Start: 2024-02-08 | End: 2024-02-15 | Stop reason: ALTCHOICE

## 2024-02-08 RX ORDER — HYDROCHLOROTHIAZIDE 12.5 MG/1
12.5 TABLET ORAL DAILY
Qty: 30 TABLET | Refills: 2 | Status: SHIPPED | OUTPATIENT
Start: 2024-02-08 | End: 2024-05-10

## 2024-02-08 NOTE — TELEPHONE ENCOUNTER
[facility] is closing so I need the two bp meds sent to [facility] [place].

## 2024-02-15 ENCOUNTER — OFFICE VISIT (OUTPATIENT)
Dept: PRIMARY CARE | Facility: CLINIC | Age: 49
End: 2024-02-15
Payer: COMMERCIAL

## 2024-02-15 VITALS
SYSTOLIC BLOOD PRESSURE: 124 MMHG | WEIGHT: 237 LBS | HEART RATE: 101 BPM | BODY MASS INDEX: 32.14 KG/M2 | DIASTOLIC BLOOD PRESSURE: 82 MMHG | OXYGEN SATURATION: 95 %

## 2024-02-15 DIAGNOSIS — F41.1 GENERALIZED ANXIETY DISORDER: ICD-10-CM

## 2024-02-15 DIAGNOSIS — I10 BENIGN ESSENTIAL HYPERTENSION: ICD-10-CM

## 2024-02-15 LAB
ANION GAP SERPL CALC-SCNC: 14 MMOL/L (ref 10–20)
BUN SERPL-MCNC: 11 MG/DL (ref 6–23)
CALCIUM SERPL-MCNC: 9 MG/DL (ref 8.6–10.3)
CHLORIDE SERPL-SCNC: 99 MMOL/L (ref 98–107)
CO2 SERPL-SCNC: 30 MMOL/L (ref 21–32)
CREAT SERPL-MCNC: 0.97 MG/DL (ref 0.5–1.3)
EGFRCR SERPLBLD CKD-EPI 2021: >90 ML/MIN/1.73M*2
GLUCOSE SERPL-MCNC: 78 MG/DL (ref 74–99)
POTASSIUM SERPL-SCNC: 4.4 MMOL/L (ref 3.5–5.3)
SODIUM SERPL-SCNC: 139 MMOL/L (ref 136–145)

## 2024-02-15 PROCEDURE — 36415 COLL VENOUS BLD VENIPUNCTURE: CPT

## 2024-02-15 PROCEDURE — 3079F DIAST BP 80-89 MM HG: CPT | Performed by: FAMILY MEDICINE

## 2024-02-15 PROCEDURE — 3074F SYST BP LT 130 MM HG: CPT | Performed by: FAMILY MEDICINE

## 2024-02-15 PROCEDURE — 80048 BASIC METABOLIC PNL TOTAL CA: CPT

## 2024-02-15 PROCEDURE — 99213 OFFICE O/P EST LOW 20 MIN: CPT | Performed by: FAMILY MEDICINE

## 2024-02-15 RX ORDER — CITALOPRAM 20 MG/1
TABLET, FILM COATED ORAL
Qty: 135 TABLET | Refills: 3 | Status: SHIPPED | OUTPATIENT
Start: 2024-02-15

## 2024-02-15 RX ORDER — LORAZEPAM 1 MG/1
TABLET ORAL
Qty: 90 TABLET | Refills: 0 | Status: SHIPPED | OUTPATIENT
Start: 2024-02-15 | End: 2024-06-10 | Stop reason: SDUPTHER

## 2024-02-15 RX ORDER — PROPRANOLOL HYDROCHLORIDE 60 MG/1
60 CAPSULE, EXTENDED RELEASE ORAL DAILY
Qty: 30 CAPSULE | Refills: 0 | Status: SHIPPED | OUTPATIENT
Start: 2024-02-15 | End: 2024-03-08 | Stop reason: SINTOL

## 2024-02-15 RX ORDER — ARIPIPRAZOLE 5 MG/1
5 TABLET ORAL DAILY
Qty: 90 TABLET | Refills: 1 | Status: SHIPPED | OUTPATIENT
Start: 2024-02-15

## 2024-02-15 NOTE — PROGRESS NOTES
Subjective   Patient ID: Abdulaziz Ortez is a 48 y.o. male who presents for Follow-up (Needs meds renewed.).    HPI     LOV 11/2023                At that appt - was trying to get him to take Lisinopril/hydrochlorothiazide for HTN and edema He is taking Losartan 12.5 mg a day   And hydrochlorothiazide 12.5 mg a day   Swelling in legs is still pretty significant     Notices HR fast in the AM     Labs in AUG WNL  -      (Nml renal function)   Echo Aug 2023 - WNL                              Severe generalized anxiety disorder -      Anxiety  - flared right now -due to winter         Last rx Lorazepam #100  on 11/14/23 - but he only received #50 -  [facility] agrees -   Had enough left over from other RX - he is now just out -         Taking one a day         Agrees I can make RX for #90    Long history  of anxiety   Taking Lorazepam 1 mg hs  HE STATES HE GOES INTO A PANIC ATTACK IF HE SEES HE IS CLOSE TO RUNNING OUT   I have tried to wean down several times - gets MUCH worse when I do -   Have at least been able to get him down to just one a day     Also on Citalopram 30 mg a day and     Aripiprazole 5 mg a day -   has been on several meds for anxiety in the past - these meds have helped him the most       Has tried propranolol and hydroxyzine - does not do well with either      I have personally reviewed the [facility] report for this person. I find it to be appropriate.   I have considered the risk of abuse, dependence, addiction and diversion.  I believe that it is clinically appropriate for this person to be prescribed this medication for the documented diagnoses.   [facility] report was reviewed on any day a prescription is written for a controlled substance.        Overdose risk score of  460   Contract - updated - electronic 5/3/23  UDS  - done with pain management (will not perform again due to cost)      JANETTE - 7 - 1 only 5/3/23     We have discussed the great risk of narcotics with benzos         For herniated disc and DDD  of spine -   DR Riley - partial C3 laminectomy and C4 - 6 laminoplasty on 12/11/2020 12/16/22 - Lumbar L3- decompressive laminectomy     Has significant chronic pain upper back and low back   CANNOT HAVE STEROIDS - TERRIBLE REACTION IN THE PAST -psychiatric     (will not even try topical - OTC cortisone even effects him)      trying to eat a low amount of sugar and gluten              Pain Management - seeing DR Gale  Use to see  DR Angie Tavares - pain management   Did see DR Knight ([facility]he[facility]) - she does not feel he has psoriatic arthritis   (Does have psoriasis)     Pain meds - Oxycodone 5 mg TID with 2 Tylenol       No NSAIDS      smoking - down to  5  cig a day  -     About the same      Declines vaccines despite my continued encouragement       Urinating every hour  - one year   Does have dribbling at times  No dysuria   Nocturia 1 -2  times has  Does not want medication now     Drinking less water - no change     Review of Systems    Objective   /82 (BP Location: Left arm, Patient Position: Sitting, BP Cuff Size: Large adult)   Pulse 101   Wt 108 kg (237 lb)   SpO2 95%   BMI 32.14 kg/m²     Physical Exam  Vitals reviewed.   Constitutional:       General: He is not in acute distress.     Appearance: Normal appearance. He is obese. He is not ill-appearing, toxic-appearing or diaphoretic.   HENT:      Head: Normocephalic and atraumatic.      Mouth/Throat:      Mouth: Mucous membranes are moist.   Cardiovascular:      Rate and Rhythm: Tachycardia present.      Heart sounds: Normal heart sounds. No murmur heard.     No friction rub.      Comments: Mildly tachy - regular   Pulmonary:      Effort: Pulmonary effort is normal.      Breath sounds: Normal breath sounds.   Musculoskeletal:         General: Swelling (1 plus edema ankles - no calf tenderness) present.   Neurological:      General: No focal deficit present.      Mental Status: He is alert and oriented to person, place, and time.          Assessment/Plan   Problem List Items Addressed This Visit          High    Anxiety disorder    Relevant Medications    propranolol LA (Inderal LA) 60 mg 24 hr capsule    ARIPiprazole (Abilify) 5 mg tablet    citalopram (CeleXA) 20 mg tablet    LORazepam (Ativan) 1 mg tablet    Other Relevant Orders    Basic Metabolic Panel       Medium    Benign essential hypertension    Relevant Orders    Basic Metabolic Panel   Long history of anxiety - see above - Lorazepam refilled today- at #90    Seeing pain management     Very aware of risk of Benzo and Opioids     HTN - intermittent edema   Mild tachycardia   Try Propranolol LA 60 in place of Losartan     And having LUTS - did not want med       We discussed at visit any disease processes that were of concern as well as the risks, benefits and instructions of any new medication provided.    See orders and discussion section for information handed to patient on their Clinical Summary.   Patient (and/or caretaker of patient if present)  stated all questions were answered, and they voiced understanding of instructions.

## 2024-02-15 NOTE — PATIENT INSTRUCTIONS
Try support stockings for the swelling of the legs   Keep legs elevated when sitting         Lets change the losartan to Propranolol LA 60 mg one daily  - take at night   Still stay on the hydrochlorothiazide  -  if you are getting too light headed - can take the water pill as needed.        Keep working on quitting smoking       I will have your test results on your [employer] card within a week.    If I don't, please call and leave me a message that they were not there.  I may have not seen them.       To call for your test results,  the [employer] phone number on the card is    [phone]  You also need your Mail[facility] ID number and your Pin number which are on your card.   If you need assistance using the card or if you have lost it, call the [employer] help number at   [phone]         You have been given  prescription(s) for a controlled medication.   This/these medications  can be dangerous if not taken exactly as directed.    You have filled out a controlled medication contract, and it is very important to abide by the contract in order for me to continue to prescribe these medications for you.  You must take these medications as directed, you cannot let anyone else take any of these medications.  You have to keep them in a safe place to protect other people or animals from getting into them.     I cannot refill them if you request a refill earlier than expected.   You cannot get refills for these medications on weekends or after office hours.   You must make sure you have a visit with me every 3 months in order for me to continue to prescribe this/these medications.   Its important you are sure to ask me if you have any questions about our policies on these medications or the medications themselves.    Please bring your bottles of any remaining medications with you to any appointment where you will be needing refills of these medications.     You can be called at any time to come in for a drug screen  "and/or a pill count if we feel it is necessary.   If we do call you for this, you would need to come into the office within 24 hours of our call.           For General Healthy Nutrition    (Remember - NOT A DIET!   Diets are only good for class reunions.)    These are my general good nutrition recommendations for most people.   I use the term \" diet \"  in these instructions to mean your overall nutrition - how you eat and drink.   If we talked about something different during your visit with me,  other than what is written below,  follow that advice instead.       For most people,  eating healthier means getting less added sugar and less processed foods in your diet    The fresher the better.    Added sugar is now a part of the nutrition label on manufactured food, so you can keep an eye on it easier.    But basically,  foods and beverages  that contain regular sugar and corn syrup are the main sources of added sugars.  Eating as little of these foods as you can is best.   One shocking example of the epidemic of added sugar is soda.    One can of regular soda contains about as much added sugar as 3 regular size doughnuts!     The other issue with processed foods is the amount of processed grains they contain , such as white flour.    This is also something you want to try to limit in your diet.     But, grain products are very important for your nutrition.    Whole grains are better for your body.     Cutting back on white breads, traditional pasta, baked goods, white rice,  and processed cereals will be healthier for you.   The better choices include whole grain breads,  whole wheat pasta,  brown rice, quinoa, barley, steel cut  or rolled oats.   If you eat cereal for breakfast, try to look for one made with whole grains and less sugar.   There are many people who have a problem with gluten, for a large variety of reasons.    Generally,  products made with wheat flour , barley or rye are the primary source of " "gluten.       Cutting back on saturated fats is important.    You want the majority of the meat that you eat to be chicken, fish or turkey.   Baked or broiled is best -  fried adds too much fat.    There are healthy fats that are important - fat is important for holding down appetite, vitamin absorption and several metabolic processes in the body.  Monounsaturated fats raise HDL (good cholesterol) and lower LDL (bad cholesterol).   Olive oil, peanut oil, nuts, seeds, and avocados are great sources of the good fats.       Ideas are:   Trade sour cream dip for hummus (which is rich in olive oil) or guacamole; use veggies or whole-wheat chips to dip.    Nuts are an excellent source of protein and healthy fats.   Tree nuts are the best kind, such as almonds or walnuts.   Just be careful - they are high in calories, so stick to a serving size.  (Most are about 200 calories for a 1/4 cup)      Proteins are very important for your body, and they also hold down your appetite.   Try to have protein with every meal.    These generally are meats, nuts, many beans, legumes, eggs, and dairy.   You will find protein in whole grain products and some green vegetables have a little too.     When you have dairy (if you can - many people are lactose intolerant) try to make it low fat.    Ideas are 1% milk, lowfat yogurt or cheeses, low fat cottage cheese.   I don't generally recommend FAT FREE because they often contain artificial products to improve taste, and the fat helps hold down your appetite.   If you are lactose intolerant, try to see if taking Lactaid before having dairy helps.      Fresh fruits and vegetables are VERY important.  The brighter the better.   Many vegetables are considered \"Free Foods\" - meaning you can eat as much as you want, and it does not matter.  These include tomatoes, cucumbers, celery, peppers, all the various lettuces and kale - to name a few.   Potatoes, corn and peas are starchy, so do have more " calories, but are still healthy - you just want to watch the amount of them you eat.       Fruits are full of wonderful nutrition.   They contain natural sugar called fructose, so eating them in moderation is best.   Diabetics may need to pay careful attention to how their body reacts to the sugar.  Some fruits might drastically increase their blood sugar.      Eating smaller meals with a couple of small snacks is better for your metabolism than not eating for long amounts of time  (breakfast is very important).   Trying to avoid large meals is helpful too.    Eating like this helps keep your appetite down and keeps you in burning metabolism rather than storage metabolism so your body will use the calories you eat.       I do not tell people to stop eating sweets or snack foods - just limit the amounts you have.  The less the better.   Pay attention to serving sizes, and treat them as a treat.        Foods like doughnuts, pop tarts, sugar cereals, cookies  ARE NOT GOOD FOR BREAKFAST.   They are loaded with sugar and will cause you to be hungrier in the day and often not feel well.    Caffeine needs to be limited - no more than 2 servings a day.  Some people can't have any at all.    (if you have any sleep or anxiety issues - stop the caffeine)   Coffee, many teas, many sodas, energy drinks, almost any diet supplement,  and chocolate all contain caffeine.      Water is important.   For most people, 8   x  8 ounces  a day are needed.  This may vary for some health issues.    If you need to be on a low sodium diet, that means looking at labels and eating only 1000 - 2000 mg of sodium a day.    Calcium intake is important.  3 servings of a high calcium food or drink a day is recommended.   This is usually a cup of milk, a cup of yogurt, an ounce of a hard cheese or 1.5 ounces of a soft cheese are the usual servings.   There are other high calcium foods - including soy or almond milk, broccoli,  almonds, dark green leafy  vegetable.   Make sure you are not getting more than 1000  - 1200 mg of total calcium a day (unless you have been told you need more by a doctor).    Vitamin D 3 is important to absorb the calcium and for your immune system.   For children, 400 IU a day is recommended.   For adults - 800 - 5000 IU a day  is recommended.  (Often the amount needed is individualized for adults - be sure to ask how much is right for you)    Physical activity is very important for good health.    Finding activities that give you regular exercise is very important for good health.  Try to find exercise you enjoy doing on a regular basis.    30 minutes at least 5 days a week of a good cardiovascular exercise is recommended.   That means something that gets your heart rate going faster than your usual baseline and you can find yourself breathing harder than usual while you are exercising.  If you have not done any exercise in a long time,  make sure you ask if its safe for you to start,  and be sure to gradually work up to your goal.      If you need to lose weight,  following these recommendations will help you.   And if you are doing all of this and still not losing weight, then its likely just the amount of food you are eating.   Learn to cut back on portion sizes.  Using smaller plates may help.  Healthy weight loss is  only about a pound a week.   You have to remember that whatever you do to lose the weight, you must be prepared to keep it up for life for the weight to stay off.     A lot of people have a lot of luck with using something like a fit bit,  or a program where you keep track of all of your calories that you eat and what you burn off in the day.

## 2024-03-08 ENCOUNTER — TELEPHONE (OUTPATIENT)
Dept: PRIMARY CARE | Facility: CLINIC | Age: 49
End: 2024-03-08
Payer: COMMERCIAL

## 2024-03-08 DIAGNOSIS — I10 BENIGN ESSENTIAL HYPERTENSION: Primary | ICD-10-CM

## 2024-03-08 RX ORDER — LOSARTAN POTASSIUM 25 MG/1
25 TABLET ORAL DAILY
Qty: 30 TABLET | Refills: 2 | Status: SHIPPED | OUTPATIENT
Start: 2024-03-08 | End: 2024-05-16 | Stop reason: SDUPTHER

## 2024-03-08 NOTE — TELEPHONE ENCOUNTER
I can't take the propranolol because it lowers my blood pressure too low and I feel awful on it.  I want to go back on the losartan but I can't call it in because the pharmacy said it has been cancelled.  Will you please send that in for me?

## 2024-04-12 ENCOUNTER — TELEPHONE (OUTPATIENT)
Dept: PAIN MEDICINE | Facility: CLINIC | Age: 49
End: 2024-04-12
Payer: COMMERCIAL

## 2024-04-12 DIAGNOSIS — M47.12 CERVICAL SPONDYLOSIS WITH MYELOPATHY: ICD-10-CM

## 2024-04-12 NOTE — TELEPHONE ENCOUNTER
Yes, can increase to every 4 hours as needed for one week and then return to TID. Can add methocarb 500 mg TID for 10 days #30 no refill to see if this help. thanks

## 2024-04-12 NOTE — TELEPHONE ENCOUNTER
Patient has appt with you 4/30/24. He is having flareup of his chronic axial LBP after working in the yard. No new weakness or numbness. He is unable to take steroid ( psychosis,required admission) or NSAIDS (GI upset). He is asking if can temporarily increase his oxycodone to tid.Please advise

## 2024-04-16 RX ORDER — OXYCODONE HYDROCHLORIDE 5 MG/1
5 TABLET ORAL 3 TIMES DAILY PRN
Qty: 84 TABLET | Refills: 0 | Status: SHIPPED | OUTPATIENT
Start: 2024-04-21 | End: 2024-04-30 | Stop reason: SDUPTHER

## 2024-04-16 NOTE — TELEPHONE ENCOUNTER
New RX sent to RX with fill date of4/21 instead of 5/2 due to temp increase in dose from tid to q6hr for acute injury

## 2024-04-30 ENCOUNTER — OFFICE VISIT (OUTPATIENT)
Dept: PAIN MEDICINE | Facility: CLINIC | Age: 49
End: 2024-04-30
Payer: COMMERCIAL

## 2024-04-30 VITALS
HEART RATE: 88 BPM | SYSTOLIC BLOOD PRESSURE: 141 MMHG | DIASTOLIC BLOOD PRESSURE: 91 MMHG | OXYGEN SATURATION: 96 % | RESPIRATION RATE: 17 BRPM

## 2024-04-30 DIAGNOSIS — M47.12 CERVICAL SPONDYLOSIS WITH MYELOPATHY: ICD-10-CM

## 2024-04-30 DIAGNOSIS — Z98.1 S/P CERVICAL SPINAL FUSION: ICD-10-CM

## 2024-04-30 DIAGNOSIS — M96.1 LUMBAR POSTLAMINECTOMY SYNDROME: Primary | ICD-10-CM

## 2024-04-30 PROCEDURE — 3077F SYST BP >= 140 MM HG: CPT | Performed by: NURSE PRACTITIONER

## 2024-04-30 PROCEDURE — 99213 OFFICE O/P EST LOW 20 MIN: CPT | Performed by: NURSE PRACTITIONER

## 2024-04-30 PROCEDURE — 3080F DIAST BP >= 90 MM HG: CPT | Performed by: NURSE PRACTITIONER

## 2024-04-30 RX ORDER — OXYCODONE HYDROCHLORIDE 5 MG/1
5 TABLET ORAL 3 TIMES DAILY PRN
Qty: 84 TABLET | Refills: 0 | Status: SHIPPED | OUTPATIENT
Start: 2024-05-02 | End: 2024-05-30

## 2024-04-30 RX ORDER — OXYCODONE HYDROCHLORIDE 5 MG/1
5 TABLET ORAL 3 TIMES DAILY PRN
Qty: 84 TABLET | Refills: 0 | Status: SHIPPED | OUTPATIENT
Start: 2024-06-20 | End: 2024-07-18

## 2024-04-30 RX ORDER — OXYCODONE HYDROCHLORIDE 5 MG/1
5 TABLET ORAL 3 TIMES DAILY PRN
Qty: 84 TABLET | Refills: 0 | Status: SHIPPED | OUTPATIENT
Start: 2024-05-30 | End: 2024-06-27

## 2024-04-30 ASSESSMENT — ENCOUNTER SYMPTOMS
EYES NEGATIVE: 1
CONSTITUTIONAL NEGATIVE: 1
WEAKNESS: 0
DIZZINESS: 0
RESPIRATORY NEGATIVE: 1
ALLERGIC/IMMUNOLOGIC NEGATIVE: 1
NUMBNESS: 0
PSYCHIATRIC NEGATIVE: 1
LIGHT-HEADEDNESS: 0
TREMORS: 0
HEMATOLOGIC/LYMPHATIC NEGATIVE: 1
ARTHRALGIAS: 1
HEADACHES: 0
MYALGIAS: 1
CARDIOVASCULAR NEGATIVE: 1
BACK PAIN: 1
ENDOCRINE NEGATIVE: 1
GASTROINTESTINAL NEGATIVE: 1

## 2024-04-30 ASSESSMENT — PAIN SCALES - GENERAL: PAINLEVEL: 6

## 2024-04-30 NOTE — PROGRESS NOTES
Subjective   Patient ID: Abdulaziz Ortez is a 49 y.o. male who presents for Med Refill and Pain (Low Back, Neck ).  HPI    Abdulaziz follows up for interval reevaluation of his chronic cervical pain from spondylosis and with history of posterior cervical spinal fusion. He is also with low back pain from lumbar disc herniation creating lumbar stenosis and right greater than left lower extremity radiculitis. He is also with sacroiliac and hip pain from arthritis. History of L3-L5 decompressive lumbar surgery 2022.     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     Percocet 5 mg to take up to 3 times a day to help with his pain. Medication can control pain between 30 and 40%. Average pain score with medication is 5 out of 10 and without is 8 out of 10.     Has tried muscle relaxers and these have not helped reduce between the shoulder blade pain. Cannot have prednisone as it is side effect of psychosis. Has been on gabapentin on high doses and this has not helped in the past. Does have a TENS unit that he can use. Advised that he can use this daily as he needs. Also discussed over-the-counter medications of acetaminophen and ibuprofen. Does have stomach upset from ibuprofen.      Better able to maintain full-time employment with medication. Has a below average quality family and social life with current condition and treatment secondary to pain.     Toxicology consistent July 31st, 2023.  Annual controlled substance agreement and opioid risk tool are completed and scanned into the chart October 23, 2023.     For continuity:   Given the patient's report of reduced pain and improved functional ability without adverse effects, it is reasonable to treat with narcotic medications. The terms of the opioid agreement as well as the potential risks and adverse effects of the patient's medication regimen were discussed in detail. This includes if applicable due to dosage of medication  permission to discuss and coordinate care with other treatment providers relevant to the patients condition. The patient verbalized understanding.      Risks and side effects of chronic opioid therapy including but not limited to tolerance, dependence, constipation, hyperalgesia, cognitive side effects, addiction and possible death due to overuse and or misuse were discussed. I also discussed that such medications when co-administered with other sedative agents including but not limited to alcohol, benzodiazepines, sedative hypnotics and illegal drugs could pose life threatening consequences including death. I also explained the impact that the administration of such medication has on a patient with obstructive sleep apnea and continued recommendations for use of apnea devices if ordered are prescribed by other physicians. In order to effectively and safely treat the pain, I also emphasized the importance of compliance with the treatment plan, as well as compliance with the terms of the opioid agreement, which was reviewed in detail. I explained the importance of being responsible with the medications and to take these only as prescribed, never in excess and never for reasons other than pain reduction. The patient was counseled on keeping the medications safe and locked away from children and other adults as well as disposal methods and options. The patient understood the risks and instructions.      I also discussed with the patient in detail that based on the clinical response to the opioid medications and improvements of activities of daily living, sleep, and work performance in light of compliance with the treatment plan we can continue this form of therapy for the above chronic pain. The goal and rationale used for current treatment with chronic opioid medication is to control the pain and alleviate disability induced by the chronic pain condition noted above after failures of other non-opioid and  nonpharmacological modalities to treat the chronic pain and the symptoms associated with have failed. The patient understood the goals in terms of the above treatment plan and had no further questions prior to leaving the office today.      Of note, the above-mentioned diagnoses/conditions and expected fluctuating nature of pain, and pain characteristic changes may lead to prolonged functional impairment requiring frequent and multiple reassessments with continued high level medical decision making. As noted, medication and medication management may require opiate therapy in excess of a routine less than 30 day medication requirement. The patient may require daily opiate therapy necessitating month-long prescription medication as noted above in order to perform activities of daily living and achieve acceptable quality of life with respect to their chronic pain condition for the foreseeable future. We monitor our patient's carefully through drug monitoring, medication counts, urine drug testing specific to their medication as well as a myriad of other substances and with frequent follow-ups with interval reassement of the chronic pain condition, its pathophysiology and prognosis.      The level of clinical decision making at this office visit is high due to high risks and complications including mortality and morbidity related to acute and chronic pain with respects to life, bodily function, and treatment. Risks and clinical decisions with respect to under treatment, failure to maintain adequate treatment, and/or overtreatment complications and outcomes were discussed with the patient with respect to their chronic pain conditions, interventional therapies, as well as the use of various medications including possible controlled/dangerous medications. The amount and complexity of reviewed data at this in subsequent office visits is high given patient's fluctuating clinical presentation, laboratory and radiographic  reports, prescription monitoring program data, and medication history as well as other relevant data as noted above. Pertinent negatives and positives data was used in consideration for the above-mentioned high complexity.       Given the patient's total MED, general use of daily opiates, or other coadministered medications in various classes the patient was offered a prescription for Narcan. I instructed the patient that it is important that patient fill this medication in order to demonstrate understanding of the gravity of possible side effects including respiratory depression and risk of overdose of this opiate load or medication combination. As such patient will be required to bring Narcan prescription to follow-up appointments as part of compliance with continued opiate care.      With respect to opiate induced constipation I discussed multiple ways to combat this problem including staying hydrated and taking over-the-counter medications such as Dulcolax, Miralax and Senna. If these treatments are not effective we could consider such medications as Amitiza, Linzess and Movantik.      Disclaimer: This note was transcribed using an audio transcription device. As such, minor errors may be present with regard to spelling, punctuation, and inadvertent word insertion. Please disregard such errors.           Narrative & Impression   Interpreted By:  Grant Molina,   STUDY:  MR CERVICAL SPINE WO IV CONTRAST;  11/9/2023 11:15 am      INDICATION:  Signs/Symptoms:NECK PAIN.      COMPARISON:  None.      ACCESSION NUMBER(S):  [id number]      ORDERING CLINICIAN:  AISHWARYA ZHANG      TECHNIQUE:  The cervical spine was studied in the sagittal and axial planes  utilizing T1 and T2 weighted images.      FINDINGS:  The craniovertebral junction is normal. There is hydromyelia within  the spinal cord from C2 through C4. The cord is normal in size. There  is downward displacement and deformity of the cerebellar  tonsils  consistent with Chiari malformation.. The marrow signal is normal.  Serial axial images reveal the following: C2/C3  There is normal alignment and vertebral body height. The disc space  is normal. There is no evidence of canal or foraminal narrowing.  There is no evidence of bulging or herniated disc. C3/C4  There is normal alignment and vertebral body height. The disc space  is normal. There is no evidence of canal or foraminal narrowing.  There is no evidence of bulging or herniated disc. C4/C5  Previous right-sided laminectomy with internal fixation. Bilateral  facet hypertrophy. Asymmetrical uncovertebral joint hypertrophy  toward the right. Focal right-sided foraminal narrowing without  measurable canal stenosis. C5/C6  Previous right-sided laminectomy with internal fixation. Bilateral  facet hypertrophy without canal or foraminal narrowing C6/C7  Previous right-sided laminectomy with internal fixation. Bilateral  facet hypertrophy without canal or foraminal narrowing C7/T1  There is normal alignment and vertebral body height. The disc space  is normal. There is no evidence of canal or foraminal narrowing.  There is no evidence of bulging or herniated disc.      IMPRESSION:  * Cerebellar tonsillar ptosis with hydromyelia. Findings are  consistent with Chiari malformation *Postoperative changes as  described include right-sided laminectomies from C4 through C6 with  internal fixation. *Narrowing of intervertebral disc spaces without  measurable canal stenosis      THIS EXAMINATION WAS INTERPRETED AT [facility]      Signed by: Grant Molina 11/9/2023 11:23 AM  Dictation workstation:   [id number]   Study Result    Narrative & Impression   MRN: [id number]  Patient Name: JUNE QUINN     STUDY:  MRI L-SPINE WO;  9/8/2022 8:02 am     INDICATION:  low back pain  M51.26: Lumbar herniated disc M54.16: Lumbar  radiculopathy, right M48.061: Lumbar stenosis.     COMPARISON:  Lumbar spine x-rays dated 09/01/2022      ACCESSION NUMBER(S):  [id number]     ORDERING CLINICIAN:  JASON KIRKLAND     TECHNIQUE:  Sagittal T1, T2, STIR, axial T1 and T2 weighted images of the lumbar  spine were acquired.     FINDINGS:  There are 5 lumbar type vertebral bodies with the last well-formed  disc space labeled L5-S1.     Alignment: There is straightening of the normal lumbar lordosis with  grade 1 retrolisthesis of L2 on L3, L3 on L4, and L4 on L5 measuring  3-4 mm.     Vertebra/intervertebral discs: Vertebral body heights are maintained.  Multilevel fatty endplate degenerative signal changes are present.  There is associated STIR hyperintense signal involving the L3-S1  vertebral bodies relating to a component of marrow edema. A rounded  T1 and T2 hyperintense focus is seen within the L4 vertebral body,  likely relating to a small vertebral hemangioma. There is moderate to  severe intervertebral disc height loss at L4-5 with more mild to  moderate multilevel disc height loss throughout the remaining lumbar  spine.     Conus medullaris: The conus medullaris terminates at the L1 level.     T12-L1: No spinal canal or neural foraminal narrowing.     L1-2: Posterior disc osteophyte complex along with facet and  ligamentum flavum hypertrophy contribute to minimal narrowing of the  central spinal canal. There is mild-to-moderate right and no  significant left neural foraminal narrowing.     L2-3: There is mild central spinal canal narrowing secondary to a  posterior disc osteophyte complex along with facet and ligamentum  flavum hypertrophy. There is moderate left and mild right neural  foraminal narrowing.     L3-4: There is severe central spinal canal narrowing secondary to  components of a posterior disc osteophyte complex along with facet  and ligamentum flavum hypertrophy. There is moderate bilateral neural  foraminal narrowing, left greater than right.     L4-5: There is moderate to severe central spinal canal narrowing  secondary to components of  a posterior disc osteophyte complex along  with facet and ligamentum flavum hypertrophy. There is moderate right  and mild-to-moderate left neural foraminal narrowing.     L5-S1: There is a central disc protrusion superimposed upon a  posterior disc osteophyte complex with associated T2 hyperintense  signal seen centrally within the disc relating to an annular fissure.  There is no significant central spinal canal narrowing. There is  bilateral facet hypertrophy contributing to severe right and moderate  to severe left neural foraminal narrowing.     Paravertebral and posterior paraspinal soft tissues are unremarkable.     IMPRESSION:  Multilevel lumbar spondylosis as detailed above, most prominent at  L3-4 where there is severe central spinal canal and moderate  bilateral neural foraminal narrowing.     I personally reviewed the images/study and I agree with the findings  as stated. This study was interpreted at [facility], [place], [place].       Review of Systems   Constitutional: Negative.    HENT: Negative.     Eyes: Negative.    Respiratory: Negative.     Cardiovascular: Negative.    Gastrointestinal: Negative.    Endocrine: Negative.    Genitourinary: Negative.    Musculoskeletal:  Positive for arthralgias, back pain, gait problem and myalgias.   Skin: Negative.    Allergic/Immunologic: Negative.    Neurological:  Negative for dizziness, tremors, weakness, light-headedness, numbness and headaches.   Hematological: Negative.    Psychiatric/Behavioral: Negative.         Objective   Physical Exam  Vitals reviewed.   Constitutional:       Appearance: Normal appearance.   HENT:      Head: Normocephalic and atraumatic.   Eyes:      Conjunctiva/sclera: Conjunctivae normal.   Cardiovascular:      Pulses: Normal pulses.   Pulmonary:      Effort: Pulmonary effort is normal. No respiratory distress.   Musculoskeletal:         General: No swelling.      Right lower leg: No edema.       Left lower leg: No edema.   Skin:     General: Skin is warm and dry.      Capillary Refill: Capillary refill takes 2 to 3 seconds.   Neurological:      General: No focal deficit present.      Mental Status: He is alert and oriented to person, place, and time.      Cranial Nerves: No cranial nerve deficit.      Sensory: No sensory deficit.      Motor: No weakness.      Gait: Gait normal.         Assessment/Plan   Problem List Items Addressed This Visit             ICD-10-CM    Cervical spondylosis with myelopathy M47.12    Relevant Medications    oxyCODONE (Roxicodone) 5 mg immediate release tablet (Start on 5/2/2024)    oxyCODONE (Roxicodone) 5 mg immediate release tablet (Start on 5/30/2024)    oxyCODONE (Roxicodone) 5 mg immediate release tablet (Start on 6/20/2024)        Follow-up 12 weeks.    JERSEY Ghosh-CNP 04/30/24 9:04 AM

## 2024-05-10 DIAGNOSIS — I10 BENIGN ESSENTIAL HYPERTENSION: ICD-10-CM

## 2024-05-10 RX ORDER — HYDROCHLOROTHIAZIDE 12.5 MG/1
12.5 TABLET ORAL DAILY
Qty: 30 TABLET | Refills: 0 | Status: SHIPPED | OUTPATIENT
Start: 2024-05-10 | End: 2024-05-16 | Stop reason: SDUPTHER

## 2024-05-16 ENCOUNTER — OFFICE VISIT (OUTPATIENT)
Dept: PRIMARY CARE | Facility: CLINIC | Age: 49
End: 2024-05-16
Payer: COMMERCIAL

## 2024-05-16 ENCOUNTER — DOCUMENTATION (OUTPATIENT)
Dept: PRIMARY CARE | Facility: CLINIC | Age: 49
End: 2024-05-16

## 2024-05-16 VITALS
DIASTOLIC BLOOD PRESSURE: 86 MMHG | WEIGHT: 237 LBS | SYSTOLIC BLOOD PRESSURE: 128 MMHG | HEART RATE: 87 BPM | OXYGEN SATURATION: 96 % | BODY MASS INDEX: 32.14 KG/M2

## 2024-05-16 DIAGNOSIS — F17.200 CURRENT SMOKER: ICD-10-CM

## 2024-05-16 DIAGNOSIS — F41.1 GENERALIZED ANXIETY DISORDER: ICD-10-CM

## 2024-05-16 DIAGNOSIS — I10 BENIGN ESSENTIAL HYPERTENSION: ICD-10-CM

## 2024-05-16 DIAGNOSIS — L40.9 PSORIASIS: Primary | ICD-10-CM

## 2024-05-16 DIAGNOSIS — Q07.00 ARNOLD-CHIARI SYNDROME WITHOUT SPINA BIFIDA OR HYDROCEPHALUS (MULTI): ICD-10-CM

## 2024-05-16 PROCEDURE — 3079F DIAST BP 80-89 MM HG: CPT | Performed by: FAMILY MEDICINE

## 2024-05-16 PROCEDURE — 99214 OFFICE O/P EST MOD 30 MIN: CPT | Performed by: FAMILY MEDICINE

## 2024-05-16 PROCEDURE — 3074F SYST BP LT 130 MM HG: CPT | Performed by: FAMILY MEDICINE

## 2024-05-16 RX ORDER — TAZAROTENE 0.5 MG/G
GEL TOPICAL NIGHTLY
Qty: 30 G | Refills: 5 | Status: SHIPPED | OUTPATIENT
Start: 2024-05-16 | End: 2025-05-16

## 2024-05-16 RX ORDER — HYDROCHLOROTHIAZIDE 12.5 MG/1
12.5 TABLET ORAL DAILY
Qty: 90 TABLET | Refills: 3 | Status: SHIPPED | OUTPATIENT
Start: 2024-05-16

## 2024-05-16 RX ORDER — LOSARTAN POTASSIUM 25 MG/1
25 TABLET ORAL DAILY
Qty: 90 TABLET | Refills: 3 | Status: SHIPPED | OUTPATIENT
Start: 2024-05-16 | End: 2025-05-11

## 2024-05-16 NOTE — PATIENT INSTRUCTIONS
I sent in some gel you can try on the psoriasis each PM     I can see you in 3 months     Keep up the good job with smoking less    You have been given  prescription(s) for a controlled medication.   This/these medications  can be dangerous if not taken exactly as directed.    You have filled out a controlled medication contract, and it is very important to abide by the contract in order for me to continue to prescribe these medications for you.  You must take these medications as directed, you cannot let anyone else take any of these medications.  You have to keep them in a safe place to protect other people or animals from getting into them.     I cannot refill them if you request a refill earlier than expected.   You cannot get refills for these medications on weekends or after office hours.   You must make sure you have a visit with me every 3 months in order for me to continue to prescribe this/these medications.   Its important you are sure to ask me if you have any questions about our policies on these medications or the medications themselves.    Please bring your bottles of any remaining medications with you to any appointment where you will be needing refills of these medications.     You can be called at any time to come in for a drug screen and/or a pill count if we feel it is necessary.   If we do call you for this, you would need to come into the office within 24 hours of our call.       Hypertension Reminders:    IF YOU ARE A PERSON WHOSE BLOOD PRESSURE RUNS HIGH IN THE DOCTOR'S OFFICE,  THEN WE NEED TO VERIFY YOUR CUFF AT LEAST  ONCE YEARLY.   ALWAYS BRING CUFF WITH YOU TO ANY HYPERTENSION CHECK UP APPOINTMENT.  WE CAN RECORD YOUR BP  FROM HOME THE DAY OF THE APPOINTMENT - BUT WE HAVE TO SEE IT ON YOUR MACHINE.      To accurately check your blood pressure -  be sure to sit and relax for 5 minutes, you need your back supported, feet flat on the ground, arm heart level and relaxed.    Generally  "speaking, well controlled hypertension is below 130/80   for  most people  and if you are over 75, below 140/90  is acceptable.    Please take your medication as directed, and if you forget a dose  DO NOT DOUBLE THE DOSE THE NEXT DAY, just take is as you normally would.     It is important to stay on a low sodium diet :  1500 - 2000 mg of sodium a day  -  it is important to read labels.    Regular Exercise is very important as well.  Always gradually increase your exercise regimen.  Your goal is 30 minutes of a good cardiovascular exercise at least 5 days a week.    IF YOUR BMI (BODY MASS INDEX) IS OVER 25, LOSING WEIGHT WILL HELP CONTROL YOUR BLOOD PRESSURE.   Talk to me further if you need help doing this.        It is very important NOT to smoke  or use any tobacco products.  Talk to me about options if you want help quitting.    It is very important to keep your alcohol in take low.   Generally speaking, adult men should not drink more than 2 regular size beers a day, or no more than 2 ounces of liquor, or no more than 12 ounces of wine.  For adult women - the recommendations are half that.    BUT , THIS IS NOT UNIVERSAL   - be sure to ask me if alcohol is safe for you to drink, and if so, the acceptable amount.          For General Healthy Nutrition    (Remember - NOT A DIET!   Diets are only good for class reunions.)    These are my general good nutrition recommendations for most people.   I use the term \" diet \"  in these instructions to mean your overall nutrition - how you eat and drink.   If we talked about something different during your visit with me,  other than what is written below,  follow that advice instead.       For most people,  eating healthier means getting less added sugar and less processed foods in your diet    The fresher the better.    Added sugar is now a part of the nutrition label on manufactured food, so you can keep an eye on it easier.    But basically,  foods and beverages  that " Depression 12/21/2021    Acute cystitis with hematuria 12/21/2021    Smoking addiction 12/21/2021    EVERT (generalized anxiety disorder) 12/21/2021       Review of Systems   Constitutional:  Negative for irritability. HENT:  Positive for postnasal drip and rhinorrhea. Negative for hearing loss. Has had a tooth ache along her upper jaw and was told by her dentist that the tooth was fine--most likely coming from her sinuses. Eyes:  Negative for pain, discharge and visual disturbance. Respiratory:  Negative for chest tightness, shortness of breath and wheezing. Continues to smoke--not interested in quitting. Cardiovascular:  Negative for palpitations and leg swelling. Gastrointestinal:  Negative for blood in stool, constipation and diarrhea. Genitourinary:  Negative for difficulty urinating, frequency and urgency. Musculoskeletal:  Negative for gait problem. Neurological:  Negative for dizziness, tremors and seizures. Psychiatric/Behavioral:  Negative for decreased concentration, sleep disturbance and suicidal ideas. The patient is nervous/anxious and has insomnia. Chronic anxiety. Relieved by her alprazolam.  Taking one or two a day usually. Has had no panic attacks. Denies depression. Objective   Patient-Reported Vitals  Patient-Reported Weight: 145lb  Patient-Reported Height: 5'4       Physical Exam  [INSTRUCTIONS:  \"[x]\" Indicates a positive item  \"[]\" Indicates a negative item  -- DELETE ALL ITEMS NOT EXAMINED]    Constitutional: [x] Appears well-developed and well-nourished [x] No apparent distress      [x] Abnormal - does not appear like she feels well.       Mental status: [x] Alert and awake  [x] Oriented to person/place/time [x] Able to follow commands    [] Abnormal -     Eyes:   EOM    [x]  Normal    [] Abnormal -   Sclera  [x]  Normal    [] Abnormal -          Discharge [x]  None visible   [] Abnormal -     HENT: [x] Normocephalic, atraumatic  [] contain regular sugar and corn syrup are the main sources of added sugars.  Eating as little of these foods as you can is best.   One shocking example of the epidemic of added sugar is soda.    One can of regular soda contains about as much added sugar as 3 regular size doughnuts!     The other issue with processed foods is the amount of processed grains they contain , such as white flour.    This is also something you want to try to limit in your diet.     But, grain products are very important for your nutrition.    Whole grains are better for your body.     Cutting back on white breads, traditional pasta, baked goods, white rice,  and processed cereals will be healthier for you.   The better choices include whole grain breads,  whole wheat pasta,  brown rice, quinoa, barley, steel cut  or rolled oats.   If you eat cereal for breakfast, try to look for one made with whole grains and less sugar.   There are many people who have a problem with gluten, for a large variety of reasons.    Generally,  products made with wheat flour , barley or rye are the primary source of gluten.       Cutting back on saturated fats is important.    You want the majority of the meat that you eat to be chicken, fish or turkey.   Baked or broiled is best -  fried adds too much fat.    There are healthy fats that are important - fat is important for holding down appetite, vitamin absorption and several metabolic processes in the body.  Monounsaturated fats raise HDL (good cholesterol) and lower LDL (bad cholesterol).   Olive oil, peanut oil, nuts, seeds, and avocados are great sources of the good fats.       Ideas are:   Trade sour cream dip for hummus (which is rich in olive oil) or guacamole; use veggies or whole-wheat chips to dip.    Nuts are an excellent source of protein and healthy fats.   Tree nuts are the best kind, such as almonds or walnuts.   Just be careful - they are high in calories, so stick to a serving size.  (Most are  "about 200 calories for a 1/4 cup)      Proteins are very important for your body, and they also hold down your appetite.   Try to have protein with every meal.    These generally are meats, nuts, many beans, legumes, eggs, and dairy.   You will find protein in whole grain products and some green vegetables have a little too.     When you have dairy (if you can - many people are lactose intolerant) try to make it low fat.    Ideas are 1% milk, lowfat yogurt or cheeses, low fat cottage cheese.   I don't generally recommend FAT FREE because they often contain artificial products to improve taste, and the fat helps hold down your appetite.   If you are lactose intolerant, try to see if taking Lactaid before having dairy helps.      Fresh fruits and vegetables are VERY important.  The brighter the better.   Many vegetables are considered \"Free Foods\" - meaning you can eat as much as you want, and it does not matter.  These include tomatoes, cucumbers, celery, peppers, all the various lettuces and kale - to name a few.   Potatoes, corn and peas are starchy, so do have more calories, but are still healthy - you just want to watch the amount of them you eat.       Fruits are full of wonderful nutrition.   They contain natural sugar called fructose, so eating them in moderation is best.   Diabetics may need to pay careful attention to how their body reacts to the sugar.  Some fruits might drastically increase their blood sugar.      Eating smaller meals with a couple of small snacks is better for your metabolism than not eating for long amounts of time  (breakfast is very important).   Trying to avoid large meals is helpful too.    Eating like this helps keep your appetite down and keeps you in burning metabolism rather than storage metabolism so your body will use the calories you eat.       I do not tell people to stop eating sweets or snack foods - just limit the amounts you have.  The less the better.   Pay attention to " serving sizes, and treat them as a treat.        Foods like doughnuts, pop tarts, sugar cereals, cookies  ARE NOT GOOD FOR BREAKFAST.   They are loaded with sugar and will cause you to be hungrier in the day and often not feel well.    Caffeine needs to be limited - no more than 2 servings a day.  Some people can't have any at all.    (if you have any sleep or anxiety issues - stop the caffeine)   Coffee, many teas, many sodas, energy drinks, almost any diet supplement,  and chocolate all contain caffeine.      Water is important.   For most people, 8   x  8 ounces  a day are needed.  This may vary for some health issues.    If you need to be on a low sodium diet, that means looking at labels and eating only 1000 - 2000 mg of sodium a day.    Calcium intake is important.  3 servings of a high calcium food or drink a day is recommended.   This is usually a cup of milk, a cup of yogurt, an ounce of a hard cheese or 1.5 ounces of a soft cheese are the usual servings.   There are other high calcium foods - including soy or almond milk, broccoli,  almonds, dark green leafy vegetable.   Make sure you are not getting more than 1000  - 1200 mg of total calcium a day (unless you have been told you need more by a doctor).    Vitamin D 3 is important to absorb the calcium and for your immune system.   For children, 400 IU a day is recommended.   For adults - 800 - 5000 IU a day  is recommended.  (Often the amount needed is individualized for adults - be sure to ask how much is right for you)    Physical activity is very important for good health.    Finding activities that give you regular exercise is very important for good health.  Try to find exercise you enjoy doing on a regular basis.    30 minutes at least 5 days a week of a good cardiovascular exercise is recommended.   That means something that gets your heart rate going faster than your usual baseline and you can find yourself breathing harder than usual while you are  exercising.  If you have not done any exercise in a long time,  make sure you ask if its safe for you to start,  and be sure to gradually work up to your goal.      If you need to lose weight,  following these recommendations will help you.   And if you are doing all of this and still not losing weight, then its likely just the amount of food you are eating.   Learn to cut back on portion sizes.  Using smaller plates may help.  Healthy weight loss is  only about a pound a week.   You have to remember that whatever you do to lose the weight, you must be prepared to keep it up for life for the weight to stay off.     A lot of people have a lot of luck with using something like a fit bit,  or a program where you keep track of all of your calories that you eat and what you burn off in the day.

## 2024-05-16 NOTE — PROGRESS NOTES
Subjective   Patient ID: Abdulaziz Ortez is a 49 y.o. male who presents for Follow-up.    HPI     LOV 2/2024                At that appt - had changed him from losartan to propranolol - but then he called and said his HR was too low - wanted to go back to losartan     Echo Aug 2023 - WNL       Lab 2/2024 - BMP fine     As of today -     Doing well with Losartan 25 mg a day and hydrochlorothiazide 12.5 mg a day   BPs at home 120/70's     Swelling is still a big problem  - terrible by the end of the day  (does not want to try Lasix yet)   Stockings are too hot                     Severe generalized anxiety disorder -      Severe Generalized Anxiety  -   Getting filled only 30 at a time per [facility] -   Last RX filled #30 on 5/2/24    Long history  of anxiety   Taking Lorazepam 1 mg hs  HE STATES HE GOES INTO A PANIC ATTACK IF HE SEES HE IS CLOSE TO RUNNING OUT   I have tried to wean down several times - gets MUCH worse when I do -   Have at least been able to get him down to just one a day     Also on Citalopram 30 mg a day and   Aripiprazole 5 mg a day -   has been on several meds for anxiety in the past - these meds have helped him the most       Has tried propranolol and hydroxyzine - does not do well with either      I have personally reviewed the [facility] report for this person. I find it to be appropriate.   I have considered the risk of abuse, dependence, addiction and diversion.  I believe that it is clinically appropriate for this person to be prescribed this medication for the documented diagnoses.   [facility] report was reviewed on any day a prescription is written for a controlled substance.        Overdose risk score of  520   Contract - updated - electronic 5/3/23 - discussed today   UDS  - done with pain management (will not perform again due to cost)      JANETTE - 7  -not done today      We have discussed the great risk of narcotics with benzos         For herniated disc and DDD of spine -   DR Riley - partial C3  laminectomy and C4 - 6 laminoplasty on 12/11/2020 12/16/22 - Lumbar L3- decompressive laminectomy     Has significant chronic pain upper back and low back   CANNOT HAVE STEROIDS - TERRIBLE REACTION IN THE PAST -psychiatric     (will not even try topical - OTC cortisone even effects him)      trying to eat a low amount of sugar and gluten              Pain Management - seeing DR Gale  Use to see  DR Angie Tavares - pain management   Did see DR Knight ([facility]) - she does not feel he has psoriatic arthritis   (Does have psoriasis)     Pain meds - Oxycodone 5 mg TID with 2 Tylenol       No NSAIDS      smoking - down to  5  cig a day  -     About the same      Declines vaccines despite my continued encouragement       Urinating every hour  - one year   Does have dribbling at times  No dysuria   Nocturia 1 -2  times has  Does not want medication now     Psoriasis  - allergic to steroids - even cream     Review of Systems    Objective   /86 (BP Location: Right arm, Patient Position: Sitting, BP Cuff Size: Large adult)   Pulse 87   Wt 108 kg (237 lb)   SpO2 96%   BMI 32.14 kg/m²     Physical Exam  Vitals reviewed.   Constitutional:       General: He is not in acute distress.     Appearance: Normal appearance.   HENT:      Head: Normocephalic and atraumatic.      Nose: Nose normal.      Mouth/Throat:      Mouth: Mucous membranes are moist.      Pharynx: No posterior oropharyngeal erythema.   Eyes:      Extraocular Movements: Extraocular movements intact.      Conjunctiva/sclera: Conjunctivae normal.      Pupils: Pupils are equal, round, and reactive to light.   Cardiovascular:      Rate and Rhythm: Normal rate and regular rhythm.      Heart sounds: Normal heart sounds. No murmur heard.  Pulmonary:      Effort: Pulmonary effort is normal. No respiratory distress.      Breath sounds: Normal breath sounds. No wheezing.   Musculoskeletal:      Cervical back: No rigidity.   Lymphadenopathy:      Cervical: No cervical  adenopathy.   Skin:     General: Skin is warm and dry.      Findings: No rash.      Comments: Elbow with diffuse patches of white plaques    Neurological:      General: No focal deficit present.      Mental Status: He is alert. Mental status is at baseline.   Psychiatric:         Mood and Affect: Mood normal.         Thought Content: Thought content normal.         Assessment/Plan   Problem List Items Addressed This Visit          High    Anxiety disorder    Arnold-Chiari syndrome without spina bifida or hydrocephalus (Multi)     Stable - follows with pain management          Current smoker       Medium    Psoriasis - Primary    Relevant Medications    tazarotene (Tazorac) 0.05 % gel    Benign essential hypertension    Relevant Medications    hydroCHLOROthiazide (Microzide) 12.5 mg tablet    losartan (Cozaar) 25 mg tablet     Long history of anxiety - see above - Lorazepam not due yet  - will need to be #30 at a time     Seeing pain management     Very aware of risk of Benzo and Opioids     HTN -better controlled     And having LUTS - did not want med     Psoriasis -   CANNOT HAVE STEROIDS -   Severe reactions in the past - even topical   Would like to try non-steroidal topical   RX for tazarotene gel to try       We discussed at visit any disease processes that were of concern as well as the risks, benefits and instructions of any new medication provided.    See orders and discussion section for information handed to patient on their Clinical Summary.   Patient (and/or caretaker of patient if present)  stated all questions were answered, and they voiced understanding of instructions.

## 2024-06-10 DIAGNOSIS — F41.1 GENERALIZED ANXIETY DISORDER: ICD-10-CM

## 2024-06-10 RX ORDER — LORAZEPAM 1 MG/1
TABLET ORAL
Qty: 90 TABLET | Refills: 0 | Status: SHIPPED | OUTPATIENT
Start: 2024-06-10 | End: 2024-09-10

## 2024-07-16 ENCOUNTER — OFFICE VISIT (OUTPATIENT)
Dept: PAIN MEDICINE | Facility: CLINIC | Age: 49
End: 2024-07-16
Payer: COMMERCIAL

## 2024-07-16 VITALS
DIASTOLIC BLOOD PRESSURE: 84 MMHG | SYSTOLIC BLOOD PRESSURE: 150 MMHG | OXYGEN SATURATION: 96 % | RESPIRATION RATE: 16 BRPM | HEART RATE: 93 BPM

## 2024-07-16 DIAGNOSIS — M51.16 LUMBAR DISC DISEASE WITH RADICULOPATHY: ICD-10-CM

## 2024-07-16 DIAGNOSIS — M54.16 LUMBAR RADICULOPATHY, RIGHT: ICD-10-CM

## 2024-07-16 DIAGNOSIS — M96.1 LUMBAR POSTLAMINECTOMY SYNDROME: Primary | ICD-10-CM

## 2024-07-16 DIAGNOSIS — M47.12 CERVICAL SPONDYLOSIS WITH MYELOPATHY: ICD-10-CM

## 2024-07-16 DIAGNOSIS — M96.1 POSTLAMINECTOMY SYNDROME, CERVICAL REGION: ICD-10-CM

## 2024-07-16 PROCEDURE — 3079F DIAST BP 80-89 MM HG: CPT | Performed by: NURSE PRACTITIONER

## 2024-07-16 PROCEDURE — 99214 OFFICE O/P EST MOD 30 MIN: CPT | Performed by: NURSE PRACTITIONER

## 2024-07-16 PROCEDURE — 3077F SYST BP >= 140 MM HG: CPT | Performed by: NURSE PRACTITIONER

## 2024-07-16 RX ORDER — OXYCODONE HYDROCHLORIDE 5 MG/1
5 TABLET ORAL 3 TIMES DAILY PRN
Qty: 84 TABLET | Refills: 0 | Status: SHIPPED | OUTPATIENT
Start: 2024-07-22

## 2024-07-16 RX ORDER — OXYCODONE HYDROCHLORIDE 5 MG/1
5 TABLET ORAL 3 TIMES DAILY PRN
Qty: 84 TABLET | Refills: 0 | Status: SHIPPED | OUTPATIENT
Start: 2024-09-16 | End: 2024-10-14

## 2024-07-16 RX ORDER — OXYCODONE HYDROCHLORIDE 5 MG/1
5 TABLET ORAL 3 TIMES DAILY PRN
Qty: 84 TABLET | Refills: 0 | Status: SHIPPED | OUTPATIENT
Start: 2024-08-19

## 2024-07-16 ASSESSMENT — ENCOUNTER SYMPTOMS
ENDOCRINE NEGATIVE: 1
NECK PAIN: 1
CONSTITUTIONAL NEGATIVE: 1
WEAKNESS: 0
RESPIRATORY NEGATIVE: 1
CARDIOVASCULAR NEGATIVE: 1
HEADACHES: 0
NUMBNESS: 0
ARTHRALGIAS: 1
EYES NEGATIVE: 1
ALLERGIC/IMMUNOLOGIC NEGATIVE: 1
GASTROINTESTINAL NEGATIVE: 1
LIGHT-HEADEDNESS: 0
HEMATOLOGIC/LYMPHATIC NEGATIVE: 1
DIZZINESS: 0
TREMORS: 0
MYALGIAS: 1
BACK PAIN: 1
PSYCHIATRIC NEGATIVE: 1

## 2024-07-16 ASSESSMENT — PAIN DESCRIPTION - DESCRIPTORS: DESCRIPTORS: SHARP;NUMBNESS

## 2024-07-16 ASSESSMENT — PAIN SCALES - GENERAL
PAINLEVEL_OUTOF10: 5 - MODERATE PAIN
PAINLEVEL: 5

## 2024-07-16 ASSESSMENT — PAIN - FUNCTIONAL ASSESSMENT: PAIN_FUNCTIONAL_ASSESSMENT: 0-10

## 2024-07-16 NOTE — PROGRESS NOTES
Subjective   Patient ID: Abdulaziz Ortez is a 49 y.o. male who presents for Back Pain and Neck Pain.  Back Pain  Pertinent negatives include no headaches, numbness or weakness.   Neck Pain   Pertinent negatives include no headaches, numbness or weakness.       Abdulaziz follows up for interval reevaluation of his chronic cervical pain from spondylosis and with history of posterior cervical spinal fusion. He is also with low back pain from lumbar disc herniation creating lumbar stenosis and right greater than left lower extremity radiculitis. He is also with sacroiliac and hip pain from arthritis. History of L3-L5 decompressive lumbar surgery 2022.     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     Percocet 5 mg to take up to 3 times a day to help with his pain. Medication can control pain up to 50%. Average pain score with medication is 5 out of 10 and without is 8 out of 10.     Has tried muscle relaxers and these have not helped reduce between the shoulder blade pain. Cannot have prednisone as it is side effect of psychosis. Has been on gabapentin on high doses and this has not helped in the past. Does have a TENS unit that he can use. Advised that he can use this daily as he needs. Also discussed over-the-counter medications of acetaminophen and ibuprofen. Does have stomach upset from ibuprofen.      Better able to maintain full-time employment with medication. Has a below average quality family and social life with current condition and treatment secondary to pain.     Toxicology consistent July 31st, 2023.  Toxicology today.  Annual controlled substance agreement and opioid risk tool are completed and scanned into the chart October 23, 2023.     For continuity:   Given the patient's report of reduced pain and improved functional ability without adverse effects, it is reasonable to treat with narcotic medications. The terms of the opioid agreement as well as the potential risks  and adverse effects of the patient's medication regimen were discussed in detail. This includes if applicable due to dosage of medication permission to discuss and coordinate care with other treatment providers relevant to the patients condition. The patient verbalized understanding.      Risks and side effects of chronic opioid therapy including but not limited to tolerance, dependence, constipation, hyperalgesia, cognitive side effects, addiction and possible death due to overuse and or misuse were discussed. I also discussed that such medications when co-administered with other sedative agents including but not limited to alcohol, benzodiazepines, sedative hypnotics and illegal drugs could pose life threatening consequences including death. I also explained the impact that the administration of such medication has on a patient with obstructive sleep apnea and continued recommendations for use of apnea devices if ordered are prescribed by other physicians. In order to effectively and safely treat the pain, I also emphasized the importance of compliance with the treatment plan, as well as compliance with the terms of the opioid agreement, which was reviewed in detail. I explained the importance of being responsible with the medications and to take these only as prescribed, never in excess and never for reasons other than pain reduction. The patient was counseled on keeping the medications safe and locked away from children and other adults as well as disposal methods and options. The patient understood the risks and instructions.      I also discussed with the patient in detail that based on the clinical response to the opioid medications and improvements of activities of daily living, sleep, and work performance in light of compliance with the treatment plan we can continue this form of therapy for the above chronic pain. The goal and rationale used for current treatment with chronic opioid medication is to  control the pain and alleviate disability induced by the chronic pain condition noted above after failures of other non-opioid and nonpharmacological modalities to treat the chronic pain and the symptoms associated with have failed. The patient understood the goals in terms of the above treatment plan and had no further questions prior to leaving the office today.      Of note, the above-mentioned diagnoses/conditions and expected fluctuating nature of pain, and pain characteristic changes may lead to prolonged functional impairment requiring frequent and multiple reassessments with continued high level medical decision making. As noted, medication and medication management may require opiate therapy in excess of a routine less than 30 day medication requirement. The patient may require daily opiate therapy necessitating month-long prescription medication as noted above in order to perform activities of daily living and achieve acceptable quality of life with respect to their chronic pain condition for the foreseeable future. We monitor our patient's carefully through drug monitoring, medication counts, urine drug testing specific to their medication as well as a myriad of other substances and with frequent follow-ups with interval reassement of the chronic pain condition, its pathophysiology and prognosis.      The level of clinical decision making at this office visit is high due to high risks and complications including mortality and morbidity related to acute and chronic pain with respects to life, bodily function, and treatment. Risks and clinical decisions with respect to under treatment, failure to maintain adequate treatment, and/or overtreatment complications and outcomes were discussed with the patient with respect to their chronic pain conditions, interventional therapies, as well as the use of various medications including possible controlled/dangerous medications. The amount and complexity of reviewed  data at this in subsequent office visits is high given patient's fluctuating clinical presentation, laboratory and radiographic reports, prescription monitoring program data, and medication history as well as other relevant data as noted above. Pertinent negatives and positives data was used in consideration for the above-mentioned high complexity.       Given the patient's total MED, general use of daily opiates, or other coadministered medications in various classes the patient was offered a prescription for Narcan. I instructed the patient that it is important that patient fill this medication in order to demonstrate understanding of the gravity of possible side effects including respiratory depression and risk of overdose of this opiate load or medication combination. As such patient will be required to bring Narcan prescription to follow-up appointments as part of compliance with continued opiate care.      With respect to opiate induced constipation I discussed multiple ways to combat this problem including staying hydrated and taking over-the-counter medications such as Dulcolax, Miralax and Senna. If these treatments are not effective we could consider such medications as Amitiza, Linzess and Movantik.      Disclaimer: This note was transcribed using an audio transcription device. As such, minor errors may be present with regard to spelling, punctuation, and inadvertent word insertion. Please disregard such errors.           Narrative & Impression   Interpreted By:  Grant Molina,   STUDY:  MR CERVICAL SPINE WO IV CONTRAST;  11/9/2023 11:15 am      INDICATION:  Signs/Symptoms:NECK PAIN.      COMPARISON:  None.      ACCESSION NUMBER(S):  [id number]      ORDERING CLINICIAN:  AISHWARYA ZHANG      TECHNIQUE:  The cervical spine was studied in the sagittal and axial planes  utilizing T1 and T2 weighted images.      FINDINGS:  The craniovertebral junction is normal. There is hydromyelia within  the spinal  cord from C2 through C4. The cord is normal in size. There  is downward displacement and deformity of the cerebellar tonsils  consistent with Chiari malformation.. The marrow signal is normal.  Serial axial images reveal the following: C2/C3  There is normal alignment and vertebral body height. The disc space  is normal. There is no evidence of canal or foraminal narrowing.  There is no evidence of bulging or herniated disc. C3/C4  There is normal alignment and vertebral body height. The disc space  is normal. There is no evidence of canal or foraminal narrowing.  There is no evidence of bulging or herniated disc. C4/C5  Previous right-sided laminectomy with internal fixation. Bilateral  facet hypertrophy. Asymmetrical uncovertebral joint hypertrophy  toward the right. Focal right-sided foraminal narrowing without  measurable canal stenosis. C5/C6  Previous right-sided laminectomy with internal fixation. Bilateral  facet hypertrophy without canal or foraminal narrowing C6/C7  Previous right-sided laminectomy with internal fixation. Bilateral  facet hypertrophy without canal or foraminal narrowing C7/T1  There is normal alignment and vertebral body height. The disc space  is normal. There is no evidence of canal or foraminal narrowing.  There is no evidence of bulging or herniated disc.      IMPRESSION:  * Cerebellar tonsillar ptosis with hydromyelia. Findings are  consistent with Chiari malformation *Postoperative changes as  described include right-sided laminectomies from C4 through C6 with  internal fixation. *Narrowing of intervertebral disc spaces without  measurable canal stenosis      THIS EXAMINATION WAS INTERPRETED AT [facility] [place]      Signed by: Grant Molina 11/9/2023 11:23 AM  Dictation workstation:   [id number]   Study Result    Narrative & Impression   MRN: [id number]  Patient Name: JUNE QUINN     STUDY:  MRI L-SPINE WO;  9/8/2022 8:02 am     INDICATION:  low back pain  M51.26: Lumbar herniated disc  M54.16: Lumbar  radiculopathy, right M48.061: Lumbar stenosis.     COMPARISON:  Lumbar spine x-rays dated 09/01/2022     ACCESSION NUMBER(S):  [id number]     ORDERING CLINICIAN:  JASON KIRKLAND     TECHNIQUE:  Sagittal T1, T2, STIR, axial T1 and T2 weighted images of the lumbar  spine were acquired.     FINDINGS:  There are 5 lumbar type vertebral bodies with the last well-formed  disc space labeled L5-S1.     Alignment: There is straightening of the normal lumbar lordosis with  grade 1 retrolisthesis of L2 on L3, L3 on L4, and L4 on L5 measuring  3-4 mm.     Vertebra/intervertebral discs: Vertebral body heights are maintained.  Multilevel fatty endplate degenerative signal changes are present.  There is associated STIR hyperintense signal involving the L3-S1  vertebral bodies relating to a component of marrow edema. A rounded  T1 and T2 hyperintense focus is seen within the L4 vertebral body,  likely relating to a small vertebral hemangioma. There is moderate to  severe intervertebral disc height loss at L4-5 with more mild to  moderate multilevel disc height loss throughout the remaining lumbar  spine.     Conus medullaris: The conus medullaris terminates at the L1 level.     T12-L1: No spinal canal or neural foraminal narrowing.     L1-2: Posterior disc osteophyte complex along with facet and  ligamentum flavum hypertrophy contribute to minimal narrowing of the  central spinal canal. There is mild-to-moderate right and no  significant left neural foraminal narrowing.     L2-3: There is mild central spinal canal narrowing secondary to a  posterior disc osteophyte complex along with facet and ligamentum  flavum hypertrophy. There is moderate left and mild right neural  foraminal narrowing.     L3-4: There is severe central spinal canal narrowing secondary to  components of a posterior disc osteophyte complex along with facet  and ligamentum flavum hypertrophy. There is moderate bilateral neural  foraminal narrowing,  left greater than right.     L4-5: There is moderate to severe central spinal canal narrowing  secondary to components of a posterior disc osteophyte complex along  with facet and ligamentum flavum hypertrophy. There is moderate right  and mild-to-moderate left neural foraminal narrowing.     L5-S1: There is a central disc protrusion superimposed upon a  posterior disc osteophyte complex with associated T2 hyperintense  signal seen centrally within the disc relating to an annular fissure.  There is no significant central spinal canal narrowing. There is  bilateral facet hypertrophy contributing to severe right and moderate  to severe left neural foraminal narrowing.     Paravertebral and posterior paraspinal soft tissues are unremarkable.     IMPRESSION:  Multilevel lumbar spondylosis as detailed above, most prominent at  L3-4 where there is severe central spinal canal and moderate  bilateral neural foraminal narrowing.     I personally reviewed the images/study and I agree with the findings  as stated. This study was interpreted at [place], [place].       Review of Systems   Constitutional: Negative.    HENT: Negative.     Eyes: Negative.    Respiratory: Negative.     Cardiovascular: Negative.    Gastrointestinal: Negative.    Endocrine: Negative.    Genitourinary: Negative.    Musculoskeletal:  Positive for arthralgias, back pain, gait problem, myalgias and neck pain.   Skin: Negative.    Allergic/Immunologic: Negative.    Neurological:  Negative for dizziness, tremors, weakness, light-headedness, numbness and headaches.   Hematological: Negative.    Psychiatric/Behavioral: Negative.         Objective   Physical Exam  Vitals reviewed.   Constitutional:       Appearance: Normal appearance.   HENT:      Head: Normocephalic and atraumatic.   Eyes:      Conjunctiva/sclera: Conjunctivae normal.   Cardiovascular:      Pulses: Normal pulses.   Pulmonary:      Effort:  Pulmonary effort is normal. No respiratory distress.   Musculoskeletal:         General: No swelling.      Right lower leg: No edema.      Left lower leg: No edema.   Skin:     General: Skin is warm and dry.      Capillary Refill: Capillary refill takes 2 to 3 seconds.   Neurological:      General: No focal deficit present.      Mental Status: He is alert and oriented to person, place, and time.      Cranial Nerves: No cranial nerve deficit.      Sensory: No sensory deficit.      Motor: No weakness.      Gait: Gait normal.       Abdulaziz was seen today for back pain and neck pain.  Diagnoses and all orders for this visit:  Lumbar postlaminectomy syndrome (Primary)  -     oxyCODONE (Roxicodone) 5 mg immediate release tablet; Take 1 tablet (5 mg) by mouth 3 times a day as needed for severe pain (7 - 10). Do not fill before July 22, 2024.  Lumbar radiculopathy, right  -     oxyCODONE (Roxicodone) 5 mg immediate release tablet; Take 1 tablet (5 mg) by mouth 3 times a day as needed for severe pain (7 - 10). Do not fill before July 22, 2024.  Lumbar disc disease with radiculopathy  -     oxyCODONE (Roxicodone) 5 mg immediate release tablet; Take 1 tablet (5 mg) by mouth 3 times a day as needed for severe pain (7 - 10). Do not fill before July 22, 2024.  Cervical spondylosis with myelopathy  -     oxyCODONE (Roxicodone) 5 mg immediate release tablet; Take 1 tablet (5 mg) by mouth 3 times a day as needed for severe pain (7 - 10). Do not fill before August 19, 2024.  -     oxyCODONE (Roxicodone) 5 mg immediate release tablet; Take 1 tablet (5 mg) by mouth 3 times a day as needed for severe pain (7 - 10) for up to 28 days. Do not fill before September 16, 2024.  Postlaminectomy syndrome, cervical region      Follow-up 12 weeks.    JERSEY Ghosh-CNP 07/16/24 10:04 AM

## 2024-08-10 DIAGNOSIS — F41.1 GENERALIZED ANXIETY DISORDER: ICD-10-CM

## 2024-08-12 RX ORDER — ARIPIPRAZOLE 5 MG/1
5 TABLET ORAL DAILY
Qty: 90 TABLET | Refills: 3 | Status: SHIPPED | OUTPATIENT
Start: 2024-08-12

## 2024-08-22 ENCOUNTER — APPOINTMENT (OUTPATIENT)
Dept: PRIMARY CARE | Facility: CLINIC | Age: 49
End: 2024-08-22
Payer: COMMERCIAL

## 2024-08-22 VITALS
BODY MASS INDEX: 30.65 KG/M2 | SYSTOLIC BLOOD PRESSURE: 120 MMHG | OXYGEN SATURATION: 97 % | DIASTOLIC BLOOD PRESSURE: 82 MMHG | WEIGHT: 226 LBS | HEART RATE: 87 BPM

## 2024-08-22 DIAGNOSIS — I10 BENIGN ESSENTIAL HYPERTENSION: ICD-10-CM

## 2024-08-22 DIAGNOSIS — R60.9 PERIPHERAL EDEMA: Primary | ICD-10-CM

## 2024-08-22 DIAGNOSIS — F17.200 CURRENT SMOKER: ICD-10-CM

## 2024-08-22 DIAGNOSIS — M25.50 ARTHRALGIA OF MULTIPLE JOINTS: ICD-10-CM

## 2024-08-22 DIAGNOSIS — M48.061 SPINAL STENOSIS OF LUMBAR REGION, UNSPECIFIED WHETHER NEUROGENIC CLAUDICATION PRESENT: ICD-10-CM

## 2024-08-22 DIAGNOSIS — F41.1 GENERALIZED ANXIETY DISORDER: ICD-10-CM

## 2024-08-22 LAB
ALBUMIN SERPL BCP-MCNC: 4 G/DL (ref 3.4–5)
ALP SERPL-CCNC: 80 U/L (ref 33–120)
ALT SERPL W P-5'-P-CCNC: 17 U/L (ref 10–52)
ANION GAP SERPL CALC-SCNC: 10 MMOL/L (ref 10–20)
AST SERPL W P-5'-P-CCNC: 14 U/L (ref 9–39)
BASOPHILS # BLD AUTO: 0.07 X10*3/UL (ref 0–0.1)
BASOPHILS NFR BLD AUTO: 0.7 %
BILIRUB SERPL-MCNC: 0.7 MG/DL (ref 0–1.2)
BUN SERPL-MCNC: 12 MG/DL (ref 6–23)
CALCIUM SERPL-MCNC: 9 MG/DL (ref 8.6–10.3)
CHLORIDE SERPL-SCNC: 100 MMOL/L (ref 98–107)
CHOLEST SERPL-MCNC: 187 MG/DL (ref 0–199)
CHOLESTEROL/HDL RATIO: 5.5
CO2 SERPL-SCNC: 30 MMOL/L (ref 21–32)
CREAT SERPL-MCNC: 0.91 MG/DL (ref 0.5–1.3)
EGFRCR SERPLBLD CKD-EPI 2021: >90 ML/MIN/1.73M*2
EOSINOPHIL # BLD AUTO: 0.21 X10*3/UL (ref 0–0.7)
EOSINOPHIL NFR BLD AUTO: 2.2 %
ERYTHROCYTE [DISTWIDTH] IN BLOOD BY AUTOMATED COUNT: 13.6 % (ref 11.5–14.5)
GLUCOSE SERPL-MCNC: 82 MG/DL (ref 74–99)
HCT VFR BLD AUTO: 47.7 % (ref 41–52)
HDLC SERPL-MCNC: 34.1 MG/DL
HGB BLD-MCNC: 15.6 G/DL (ref 13.5–17.5)
IMM GRANULOCYTES # BLD AUTO: 0.03 X10*3/UL (ref 0–0.7)
IMM GRANULOCYTES NFR BLD AUTO: 0.3 % (ref 0–0.9)
LDLC SERPL CALC-MCNC: 139 MG/DL
LYMPHOCYTES # BLD AUTO: 2.07 X10*3/UL (ref 1.2–4.8)
LYMPHOCYTES NFR BLD AUTO: 21.7 %
MCH RBC QN AUTO: 28.8 PG (ref 26–34)
MCHC RBC AUTO-ENTMCNC: 32.7 G/DL (ref 32–36)
MCV RBC AUTO: 88 FL (ref 80–100)
MONOCYTES # BLD AUTO: 0.52 X10*3/UL (ref 0.1–1)
MONOCYTES NFR BLD AUTO: 5.5 %
NEUTROPHILS # BLD AUTO: 6.64 X10*3/UL (ref 1.2–7.7)
NEUTROPHILS NFR BLD AUTO: 69.6 %
NON HDL CHOLESTEROL: 153 MG/DL (ref 0–149)
NRBC BLD-RTO: 0 /100 WBCS (ref 0–0)
PLATELET # BLD AUTO: 177 X10*3/UL (ref 150–450)
POTASSIUM SERPL-SCNC: 4.2 MMOL/L (ref 3.5–5.3)
PROT SERPL-MCNC: 7.2 G/DL (ref 6.4–8.2)
RBC # BLD AUTO: 5.42 X10*6/UL (ref 4.5–5.9)
SODIUM SERPL-SCNC: 136 MMOL/L (ref 136–145)
TRIGL SERPL-MCNC: 71 MG/DL (ref 0–149)
TSH SERPL-ACNC: 1.15 MIU/L (ref 0.44–3.98)
VLDL: 14 MG/DL (ref 0–40)
WBC # BLD AUTO: 9.5 X10*3/UL (ref 4.4–11.3)

## 2024-08-22 PROCEDURE — 80061 LIPID PANEL: CPT

## 2024-08-22 PROCEDURE — 3074F SYST BP LT 130 MM HG: CPT | Performed by: FAMILY MEDICINE

## 2024-08-22 PROCEDURE — 84443 ASSAY THYROID STIM HORMONE: CPT

## 2024-08-22 PROCEDURE — 80053 COMPREHEN METABOLIC PANEL: CPT

## 2024-08-22 PROCEDURE — 99214 OFFICE O/P EST MOD 30 MIN: CPT | Performed by: FAMILY MEDICINE

## 2024-08-22 PROCEDURE — 3079F DIAST BP 80-89 MM HG: CPT | Performed by: FAMILY MEDICINE

## 2024-08-22 PROCEDURE — 85025 COMPLETE CBC W/AUTO DIFF WBC: CPT

## 2024-08-22 RX ORDER — FUROSEMIDE 20 MG/1
20 TABLET ORAL DAILY PRN
Qty: 30 TABLET | Refills: 11 | Status: SHIPPED | OUTPATIENT
Start: 2024-08-22 | End: 2025-08-22

## 2024-08-22 RX ORDER — POTASSIUM CHLORIDE 20 MEQ/1
20 TABLET, EXTENDED RELEASE ORAL DAILY PRN
Qty: 30 TABLET | Refills: 11 | Status: SHIPPED | OUTPATIENT
Start: 2024-08-22 | End: 2025-08-22

## 2024-08-22 NOTE — PROGRESS NOTES
Subjective   Patient ID: Abdulaziz Ortez is a 49 y.o. male who presents for Follow-up (Need prescriptions refilled.).    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 -   Anxiety has been very high -   Finding he is taking an Ativan in the AM too this past week     He only gets #30 of his Ativan for a month     He can feel it in his stomach when his nerves are flaring     Uses distraction, deep breathing and music to help calm down     With new job - pain in shoulders and neck is much better - but low back is terrible     Has to deal with difficult people too in new nob -   [employer] guns     Would like to help swelling more   Avoiding salt     Eating very little sugar         Chronic issues reviewed today:            HTN -   Losartan 25 mg a day ,   Hydrochlorothiazide 12.5 mg a day   (Propranolol dropped pulse too low)     Swelling often   Echo Aug 2023 - WNL       Lab 2/2024 - BMP fine   Declines stockings         Severe Generalized Anxiety  -   Getting filled only 30 at a time per [place] -   Last RX filled #30 on 8/10/24    Long history  of anxiety   Taking Lorazepam 1 mg hs  HE STATES HE GOES INTO A PANIC ATTACK IF HE SEES HE IS CLOSE TO RUNNING OUT   I have tried to wean down several times - gets MUCH worse when I do -   Have at least been able to get him down to just one a day     Also on Citalopram 30 mg a day and   Aripiprazole 5 mg a day -   has been on several meds for anxiety in the past - these meds have helped him the most       Has tried propranolol and hydroxyzine - does not do well with either      I have personally reviewed the [place] report for this person. I find it to be appropriate.   I have considered the risk of abuse, dependence, addiction and diversion.  I believe that it is  clinically appropriate for this person to be prescribed this medication for the documented diagnoses.   [place] report was reviewed on any day a prescription is written for a controlled substance.        Overdose risk score of  520   Contract - updated - electronic 5/3/23 - discussed today   UDS  - done with pain management (will not perform again due to cost)      JANETTE - 7  -not done today      We have discussed the great risk of narcotics with benzos         For herniated disc and DDD of spine -   DR Riley - partial C3 laminectomy and C4 - 6 laminoplasty on 12/11/2020 12/16/22 - Lumbar L3- decompressive laminectomy     Has significant chronic pain upper back and low back   CANNOT HAVE STEROIDS - TERRIBLE REACTION IN THE PAST -psychiatric     (will not even try topical - OTC cortisone even effects him)      trying to eat a low amount of sugar and gluten              Pain Management - seeing DR Gale  Use to see  DR Angie Tavares - pain management   Did see DR Knight ([place]) - she does not feel he has psoriatic arthritis   (Does have psoriasis)     Pain meds - Oxycodone 5 mg TID with 2 Tylenol       No NSAIDS      smoking - down to  5  cig a day  -     About the same      Declines vaccines despite my continued encouragement       Urinating every hour  - one year   Does have dribbling at times  No dysuria   Nocturia 1 -2  times has  Does not want medication now   PSA  8/2023 -less than one     Psoriasis  - allergic to steroids - even cream            CRC -  no colonoscopy yet  No colon cancer in the family   Declines testing at this time - worried about coveragae     Review of Systems    Objective   /82 (BP Location: Left arm, Patient Position: Sitting, BP Cuff Size: Large adult)   Pulse 87   Wt 103 kg (226 lb)   SpO2 97%   BMI 30.65 kg/m²     Physical Exam  Vitals reviewed.   Constitutional:       General: He is not in acute distress.     Appearance: Normal appearance.   HENT:      Head: Normocephalic and  atraumatic.      Nose: Nose normal.      Mouth/Throat:      Mouth: Mucous membranes are moist.      Pharynx: No posterior oropharyngeal erythema.   Eyes:      Extraocular Movements: Extraocular movements intact.      Conjunctiva/sclera: Conjunctivae normal.      Pupils: Pupils are equal, round, and reactive to light.   Cardiovascular:      Rate and Rhythm: Normal rate and regular rhythm.      Heart sounds: Normal heart sounds. No murmur heard.  Pulmonary:      Effort: Pulmonary effort is normal. No respiratory distress.      Breath sounds: Normal breath sounds. No wheezing.   Musculoskeletal:         General: Swelling (trace) present.      Cervical back: No rigidity.   Lymphadenopathy:      Cervical: No cervical adenopathy.   Skin:     General: Skin is warm and dry.      Findings: No rash.      Comments: Elbow with diffuse patches of white plaques    Neurological:      General: No focal deficit present.      Mental Status: He is alert. Mental status is at baseline.   Psychiatric:         Mood and Affect: Mood normal.         Thought Content: Thought content normal.         Assessment/Plan   Problem List Items Addressed This Visit          High    Anxiety disorder    Current smoker    Arthralgia of multiple joints    Lumbar stenosis       Medium    Benign essential hypertension    Relevant Orders    Comprehensive Metabolic Panel    CBC and Auto Differential    Lipid Panel    TSH with reflex to Free T4 if abnormal     Other Visit Diagnoses       Peripheral edema    -  Primary    Relevant Medications    furosemide (Lasix) 20 mg tablet    potassium chloride CR (Klor-Con M20) 20 mEq ER tablet            Long history of anxiety - see above - Lorazepam not due yet  - anxiety is flared with new job-   I agreed to give him #5 more a month when he is due for rx     For swelling - discussed seeing vein specialist -   And can try furosemide prn   Avoiding sodium     Seeing pain management     Very aware of risk of Benzo and  Opioids     HTN -better controlled     And having LUTS - did not want med yet     Psoriasis - declined derm referral     CRC screen - declined at this time    We discussed at visit any disease processes that were of concern as well as the risks, benefits and instructions of any new medication provided.    See orders and discussion section for information handed to patient on their Clinical Summary.   Patient (and/or caretaker of patient if present)  stated all questions were answered, and they voiced understanding of instructions.

## 2024-08-22 NOTE — PATIENT INSTRUCTIONS
For veins:     Dr Dev Alanis -  [facility] [place] - [phone]  Dr Raz Mohseni - [phone]     Can take furosemide as needed for swelling -   On a day you take it - do not take the hydrochlorothiazide and do take the potassium too     Leave me a message when you are due for your lorazepam and remind me I am going to give you 5 extra a month .      Juan Alberto  Give them a call  [phone] about billing and coverage       You have been given  prescription(s) for a controlled medication.   This/these medications  can be dangerous if not taken exactly as directed.    You have filled out a controlled medication contract, and it is very important to abide by the contract in order for me to continue to prescribe these medications for you.  You must take these medications as directed, you cannot let anyone else take any of these medications.  You have to keep them in a safe place to protect other people or animals from getting into them.     I cannot refill them if you request a refill earlier than expected.   You cannot get refills for these medications on weekends or after office hours.   You must make sure you have a visit with me every 3 months in order for me to continue to prescribe this/these medications.   Its important you are sure to ask me if you have any questions about our policies on these medications or the medications themselves.    Please bring your bottles of any remaining medications with you to any appointment where you will be needing refills of these medications.     You can be called at any time to come in for a drug screen and/or a pill count if we feel it is necessary.   If we do call you for this, you would need to come into the office within 24 hours of our call.       Hypertension Reminders:    IF YOU ARE A PERSON WHOSE BLOOD PRESSURE RUNS HIGH IN THE DOCTOR'S OFFICE,  THEN WE NEED TO VERIFY YOUR CUFF AT LEAST  ONCE YEARLY.   ALWAYS BRING CUFF WITH YOU TO ANY HYPERTENSION CHECK UP  "APPOINTMENT.  WE CAN RECORD YOUR BP  FROM HOME THE DAY OF THE APPOINTMENT - BUT WE HAVE TO SEE IT ON YOUR MACHINE.      To accurately check your blood pressure -  be sure to sit and relax for 5 minutes, you need your back supported, feet flat on the ground, arm heart level and relaxed.    Generally speaking, well controlled hypertension is below 130/80   for  most people  and if you are over 75, below 140/90  is acceptable.    Please take your medication as directed, and if you forget a dose  DO NOT DOUBLE THE DOSE THE NEXT DAY, just take is as you normally would.     It is important to stay on a low sodium diet :  1500 - 2000 mg of sodium a day  -  it is important to read labels.    Regular Exercise is very important as well.  Always gradually increase your exercise regimen.  Your goal is 30 minutes of a good cardiovascular exercise at least 5 days a week.    IF YOUR BMI (BODY MASS INDEX) IS OVER 25, LOSING WEIGHT WILL HELP CONTROL YOUR BLOOD PRESSURE.   Talk to me further if you need help doing this.        It is very important NOT to smoke  or use any tobacco products.  Talk to me about options if you want help quitting.    It is very important to keep your alcohol in take low.   Generally speaking, adult men should not drink more than 2 regular size beers a day, or no more than 2 ounces of liquor, or no more than 12 ounces of wine.  For adult women - the recommendations are half that.    BUT , THIS IS NOT UNIVERSAL   - be sure to ask me if alcohol is safe for you to drink, and if so, the acceptable amount.        For General Healthy Nutrition    (Remember - NOT A DIET!   Diets are only good for class reunions.)    These are my general good nutrition recommendations for most people.   I use the term \" diet \"  in these instructions to mean your overall nutrition - how you eat and drink.   If we talked about something different during your visit with me,  other than what is written below,  follow that advice " instead.       For most people,  eating healthier means getting less added sugar and less processed foods in your diet    The fresher the better.    Added sugar is now a part of the nutrition label on manufactured food, so you can keep an eye on it easier.    But basically,  foods and beverages  that contain regular sugar and corn syrup are the main sources of added sugars.  Eating as little of these foods as you can is best.   One shocking example of the epidemic of added sugar is soda.    One can of regular soda contains about as much added sugar as 3 regular size doughnuts!     The other issue with processed foods is the amount of processed grains they contain , such as white flour.    This is also something you want to try to limit in your diet.     But, grain products are very important for your nutrition.    Whole grains are better for your body.     Cutting back on white breads, traditional pasta, baked goods, white rice,  and processed cereals will be healthier for you.   The better choices include whole grain breads,  whole wheat pasta,  brown rice, quinoa, barley, steel cut  or rolled oats.   If you eat cereal for breakfast, try to look for one made with whole grains and less sugar.   There are many people who have a problem with gluten, for a large variety of reasons.    Generally,  products made with wheat flour , barley or rye are the primary source of gluten.       Cutting back on saturated fats is important.    You want the majority of the meat that you eat to be chicken, fish or turkey.   Baked or broiled is best -  fried adds too much fat.    There are healthy fats that are important - fat is important for holding down appetite, vitamin absorption and several metabolic processes in the body.  Monounsaturated fats raise HDL (good cholesterol) and lower LDL (bad cholesterol).   Olive oil, peanut oil, nuts, seeds, and avocados are great sources of the good fats.       Ideas are:   Trade sour cream dip  "for hummus (which is rich in olive oil) or guacamole; use veggies or whole-wheat chips to dip.    Nuts are an excellent source of protein and healthy fats.   Tree nuts are the best kind, such as almonds or walnuts.   Just be careful - they are high in calories, so stick to a serving size.  (Most are about 200 calories for a 1/4 cup)      Proteins are very important for your body, and they also hold down your appetite.   Try to have protein with every meal.    These generally are meats, nuts, many beans, legumes, eggs, and dairy.   You will find protein in whole grain products and some green vegetables have a little too.     When you have dairy (if you can - many people are lactose intolerant) try to make it low fat.    Ideas are 1% milk, lowfat yogurt or cheeses, low fat cottage cheese.   I don't generally recommend FAT FREE because they often contain artificial products to improve taste, and the fat helps hold down your appetite.   If you are lactose intolerant, try to see if taking Lactaid before having dairy helps.      Fresh fruits and vegetables are VERY important.  The brighter the better.   Many vegetables are considered \"Free Foods\" - meaning you can eat as much as you want, and it does not matter.  These include tomatoes, cucumbers, celery, peppers, all the various lettuces and kale - to name a few.   Potatoes, corn and peas are starchy, so do have more calories, but are still healthy - you just want to watch the amount of them you eat.       Fruits are full of wonderful nutrition.   They contain natural sugar called fructose, so eating them in moderation is best.   Diabetics may need to pay careful attention to how their body reacts to the sugar.  Some fruits might drastically increase their blood sugar.      Eating smaller meals with a couple of small snacks is better for your metabolism than not eating for long amounts of time  (breakfast is very important).   Trying to avoid large meals is helpful too.  "   Eating like this helps keep your appetite down and keeps you in burning metabolism rather than storage metabolism so your body will use the calories you eat.       I do not tell people to stop eating sweets or snack foods - just limit the amounts you have.  The less the better.   Pay attention to serving sizes, and treat them as a treat.        Foods like doughnuts, pop tarts, sugar cereals, cookies  ARE NOT GOOD FOR BREAKFAST.   They are loaded with sugar and will cause you to be hungrier in the day and often not feel well.    Caffeine needs to be limited - no more than 2 servings a day.  Some people can't have any at all.    (if you have any sleep or anxiety issues - stop the caffeine)   Coffee, many teas, many sodas, energy drinks, almost any diet supplement,  and chocolate all contain caffeine.      Water is important.   For most people, 8   x  8 ounces  a day are needed.  This may vary for some health issues.    If you need to be on a low sodium diet, that means looking at labels and eating only 1000 - 2000 mg of sodium a day.    Calcium intake is important.  3 servings of a high calcium food or drink a day is recommended.   This is usually a cup of milk, a cup of yogurt, an ounce of a hard cheese or 1.5 ounces of a soft cheese are the usual servings.   There are other high calcium foods - including soy or almond milk, broccoli,  almonds, dark green leafy vegetable.   Make sure you are not getting more than 1000  - 1200 mg of total calcium a day (unless you have been told you need more by a doctor).    Vitamin D 3 is important to absorb the calcium and for your immune system.   For children, 400 IU a day is recommended.   For adults - 800 - 5000 IU a day  is recommended.  (Often the amount needed is individualized for adults - be sure to ask how much is right for you)    Physical activity is very important for good health.    Finding activities that give you regular exercise is very important for good health.   Try to find exercise you enjoy doing on a regular basis.    30 minutes at least 5 days a week of a good cardiovascular exercise is recommended.   That means something that gets your heart rate going faster than your usual baseline and you can find yourself breathing harder than usual while you are exercising.  If you have not done any exercise in a long time,  make sure you ask if its safe for you to start,  and be sure to gradually work up to your goal.      If you need to lose weight,  following these recommendations will help you.   And if you are doing all of this and still not losing weight, then its likely just the amount of food you are eating.   Learn to cut back on portion sizes.  Using smaller plates may help.  Healthy weight loss is  only about a pound a week.   You have to remember that whatever you do to lose the weight, you must be prepared to keep it up for life for the weight to stay off.     A lot of people have a lot of luck with using something like a fit bit,  or a program where you keep track of all of your calories that you eat and what you burn off in the day.

## 2024-09-16 ENCOUNTER — TELEPHONE (OUTPATIENT)
Dept: PRIMARY CARE | Facility: CLINIC | Age: 49
End: 2024-09-16
Payer: COMMERCIAL

## 2024-09-16 DIAGNOSIS — F41.1 GENERALIZED ANXIETY DISORDER: ICD-10-CM

## 2024-09-16 RX ORDER — LORAZEPAM 1 MG/1
TABLET ORAL
Qty: 35 TABLET | Refills: 2 | Status: SHIPPED | OUTPATIENT
Start: 2024-09-16 | End: 2024-12-17

## 2024-09-16 NOTE — TELEPHONE ENCOUNTER
I am in need of some more lorazepam, and just a reminder you said that you would give me a couple extras.  Also, the lasis  is not helping the swelling at all.

## 2024-10-07 ENCOUNTER — OFFICE VISIT (OUTPATIENT)
Dept: PAIN MEDICINE | Facility: CLINIC | Age: 49
End: 2024-10-07
Payer: COMMERCIAL

## 2024-10-07 VITALS
OXYGEN SATURATION: 97 % | DIASTOLIC BLOOD PRESSURE: 82 MMHG | RESPIRATION RATE: 16 BRPM | SYSTOLIC BLOOD PRESSURE: 133 MMHG | HEART RATE: 87 BPM

## 2024-10-07 DIAGNOSIS — M51.16 LUMBAR DISC DISEASE WITH RADICULOPATHY: ICD-10-CM

## 2024-10-07 DIAGNOSIS — M96.1 POSTLAMINECTOMY SYNDROME, CERVICAL REGION: ICD-10-CM

## 2024-10-07 DIAGNOSIS — M96.1 LUMBAR POSTLAMINECTOMY SYNDROME: Primary | ICD-10-CM

## 2024-10-07 DIAGNOSIS — Z79.891 USE OF OPIATES FOR THERAPEUTIC PURPOSES: ICD-10-CM

## 2024-10-07 DIAGNOSIS — M54.16 LUMBAR RADICULOPATHY, RIGHT: ICD-10-CM

## 2024-10-07 DIAGNOSIS — M47.12 CERVICAL SPONDYLOSIS WITH MYELOPATHY: ICD-10-CM

## 2024-10-07 PROCEDURE — 3079F DIAST BP 80-89 MM HG: CPT | Performed by: NURSE PRACTITIONER

## 2024-10-07 PROCEDURE — 99213 OFFICE O/P EST LOW 20 MIN: CPT | Performed by: NURSE PRACTITIONER

## 2024-10-07 PROCEDURE — 3075F SYST BP GE 130 - 139MM HG: CPT | Performed by: NURSE PRACTITIONER

## 2024-10-07 RX ORDER — OXYCODONE HYDROCHLORIDE 5 MG/1
5 TABLET ORAL 3 TIMES DAILY PRN
Qty: 84 TABLET | Refills: 0 | Status: SHIPPED | OUTPATIENT
Start: 2024-11-11

## 2024-10-07 RX ORDER — OXYCODONE HYDROCHLORIDE 5 MG/1
5 TABLET ORAL 3 TIMES DAILY PRN
Qty: 84 TABLET | Refills: 0 | Status: SHIPPED | OUTPATIENT
Start: 2024-10-14

## 2024-10-07 RX ORDER — OXYCODONE HYDROCHLORIDE 5 MG/1
5 TABLET ORAL 3 TIMES DAILY PRN
Qty: 84 TABLET | Refills: 0 | Status: SHIPPED | OUTPATIENT
Start: 2024-12-09 | End: 2025-01-06

## 2024-10-07 RX ORDER — NALOXONE HYDROCHLORIDE 4 MG/.1ML
4 SPRAY NASAL AS NEEDED
Qty: 2 EACH | Refills: 0 | Status: SHIPPED | OUTPATIENT
Start: 2024-10-07

## 2024-10-07 ASSESSMENT — ENCOUNTER SYMPTOMS
HEMATOLOGIC/LYMPHATIC NEGATIVE: 1
ALLERGIC/IMMUNOLOGIC NEGATIVE: 1
GASTROINTESTINAL NEGATIVE: 1
JOINT SWELLING: 0
HEADACHES: 0
NECK PAIN: 1
LIGHT-HEADEDNESS: 0
CARDIOVASCULAR NEGATIVE: 1
WEAKNESS: 0
ARTHRALGIAS: 1
ENDOCRINE NEGATIVE: 1
TREMORS: 0
NUMBNESS: 0
PSYCHIATRIC NEGATIVE: 1
NECK STIFFNESS: 0
RESPIRATORY NEGATIVE: 1
CONSTITUTIONAL NEGATIVE: 1
MYALGIAS: 1
BACK PAIN: 1
EYES NEGATIVE: 1
DIZZINESS: 0

## 2024-10-07 ASSESSMENT — PAIN SCALES - GENERAL
PAINLEVEL: 5
PAINLEVEL_OUTOF10: 5 - MODERATE PAIN

## 2024-10-07 ASSESSMENT — PAIN DESCRIPTION - DESCRIPTORS: DESCRIPTORS: SHOOTING;HEAVINESS

## 2024-10-07 ASSESSMENT — PAIN - FUNCTIONAL ASSESSMENT: PAIN_FUNCTIONAL_ASSESSMENT: 0-10

## 2024-10-07 NOTE — PROGRESS NOTES
Subjective   Patient ID: Abdulaziz Ortez is a 49 y.o. male who presents for Back Pain and Neck Pain.  Back Pain  Pertinent negatives include no headaches, numbness or weakness.   Neck Pain   Pertinent negatives include no headaches, numbness or weakness.       Abdulaziz follows up for interval reevaluation of his chronic cervical pain from spondylosis and with history of posterior cervical spinal fusion. He is also with low back pain from lumbar disc herniation creating lumbar stenosis and right greater than left lower extremity radiculitis. He is also with sacroiliac and hip pain from arthritis. History of L3-L5 decompressive lumbar surgery 2022.     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     Percocet 5 mg to take up to 3 times a day to help with his pain. Medication can control pain up to 40%. Average pain score with medication is 5 out of 10.     Has tried muscle relaxers and these have not helped reduce between the shoulder blade pain. Cannot have prednisone as it is side effect of psychosis. Has been on gabapentin on high doses and this has not helped in the past. Does have a TENS unit that he can use. Advised that he can use this daily as he needs. Also discussed over-the-counter medications of acetaminophen and ibuprofen. Does have stomach upset from ibuprofen.      Better able to maintain full-time employment with medication. Has a below average quality family and social life with current condition and treatment secondary to pain.     Toxicology consistent July 16, 2024.  Annual controlled substance agreement and opioid risk tool are completed and scanned into the chart October 7, 2024.     For continuity:   Given the patient's report of reduced pain and improved functional ability without adverse effects, it is reasonable to treat with narcotic medications. The terms of the opioid agreement as well as the potential risks and adverse effects of the patient's medication  regimen were discussed in detail. This includes if applicable due to dosage of medication permission to discuss and coordinate care with other treatment providers relevant to the patients condition. The patient verbalized understanding.      Risks and side effects of chronic opioid therapy including but not limited to tolerance, dependence, constipation, hyperalgesia, cognitive side effects, addiction and possible death due to overuse and or misuse were discussed. I also discussed that such medications when co-administered with other sedative agents including but not limited to alcohol, benzodiazepines, sedative hypnotics and illegal drugs could pose life threatening consequences including death. I also explained the impact that the administration of such medication has on a patient with obstructive sleep apnea and continued recommendations for use of apnea devices if ordered are prescribed by other physicians. In order to effectively and safely treat the pain, I also emphasized the importance of compliance with the treatment plan, as well as compliance with the terms of the opioid agreement, which was reviewed in detail. I explained the importance of being responsible with the medications and to take these only as prescribed, never in excess and never for reasons other than pain reduction. The patient was counseled on keeping the medications safe and locked away from children and other adults as well as disposal methods and options. The patient understood the risks and instructions.      I also discussed with the patient in detail that based on the clinical response to the opioid medications and improvements of activities of daily living, sleep, and work performance in light of compliance with the treatment plan we can continue this form of therapy for the above chronic pain. The goal and rationale used for current treatment with chronic opioid medication is to control the pain and alleviate disability induced by  the chronic pain condition noted above after failures of other non-opioid and nonpharmacological modalities to treat the chronic pain and the symptoms associated with have failed. The patient understood the goals in terms of the above treatment plan and had no further questions prior to leaving the office today.      Of note, the above-mentioned diagnoses/conditions and expected fluctuating nature of pain, and pain characteristic changes may lead to prolonged functional impairment requiring frequent and multiple reassessments with continued high level medical decision making. As noted, medication and medication management may require opiate therapy in excess of a routine less than 30 day medication requirement. The patient may require daily opiate therapy necessitating month-long prescription medication as noted above in order to perform activities of daily living and achieve acceptable quality of life with respect to their chronic pain condition for the foreseeable future. We monitor our patient's carefully through drug monitoring, medication counts, urine drug testing specific to their medication as well as a myriad of other substances and with frequent follow-ups with interval reassement of the chronic pain condition, its pathophysiology and prognosis.      The level of clinical decision making at this office visit is high due to high risks and complications including mortality and morbidity related to acute and chronic pain with respects to life, bodily function, and treatment. Risks and clinical decisions with respect to under treatment, failure to maintain adequate treatment, and/or overtreatment complications and outcomes were discussed with the patient with respect to their chronic pain conditions, interventional therapies, as well as the use of various medications including possible controlled/dangerous medications. The amount and complexity of reviewed data at this in subsequent office visits is high given  patient's fluctuating clinical presentation, laboratory and radiographic reports, prescription monitoring program data, and medication history as well as other relevant data as noted above. Pertinent negatives and positives data was used in consideration for the above-mentioned high complexity.       Given the patient's total MED, general use of daily opiates, or other coadministered medications in various classes the patient was offered a prescription for Narcan. I instructed the patient that it is important that patient fill this medication in order to demonstrate understanding of the gravity of possible side effects including respiratory depression and risk of overdose of this opiate load or medication combination. As such patient will be required to bring Narcan prescription to follow-up appointments as part of compliance with continued opiate care.      With respect to opiate induced constipation I discussed multiple ways to combat this problem including staying hydrated and taking over-the-counter medications such as Dulcolax, Miralax and Senna. If these treatments are not effective we could consider such medications as Amitiza, Linzess and Movantik.      Disclaimer: This note was transcribed using an audio transcription device. As such, minor errors may be present with regard to spelling, punctuation, and inadvertent word insertion. Please disregard such errors.           Narrative & Impression   Interpreted By:  Grant Molina,   STUDY:  MR CERVICAL SPINE WO IV CONTRAST;  11/9/2023 11:15 am      INDICATION:  Signs/Symptoms:NECK PAIN.      COMPARISON:  None.      ACCESSION NUMBER(S):  [id number]      ORDERING CLINICIAN:  AISHWARYA ZHANG      TECHNIQUE:  The cervical spine was studied in the sagittal and axial planes  utilizing T1 and T2 weighted images.      FINDINGS:  The craniovertebral junction is normal. There is hydromyelia within  the spinal cord from C2 through C4. The cord is normal in size.  There  is downward displacement and deformity of the cerebellar tonsils  consistent with Chiari malformation.. The marrow signal is normal.  Serial axial images reveal the following: C2/C3  There is normal alignment and vertebral body height. The disc space  is normal. There is no evidence of canal or foraminal narrowing.  There is no evidence of bulging or herniated disc. C3/C4  There is normal alignment and vertebral body height. The disc space  is normal. There is no evidence of canal or foraminal narrowing.  There is no evidence of bulging or herniated disc. C4/C5  Previous right-sided laminectomy with internal fixation. Bilateral  facet hypertrophy. Asymmetrical uncovertebral joint hypertrophy  toward the right. Focal right-sided foraminal narrowing without  measurable canal stenosis. C5/C6  Previous right-sided laminectomy with internal fixation. Bilateral  facet hypertrophy without canal or foraminal narrowing C6/C7  Previous right-sided laminectomy with internal fixation. Bilateral  facet hypertrophy without canal or foraminal narrowing C7/T1  There is normal alignment and vertebral body height. The disc space  is normal. There is no evidence of canal or foraminal narrowing.  There is no evidence of bulging or herniated disc.      IMPRESSION:  * Cerebellar tonsillar ptosis with hydromyelia. Findings are  consistent with Chiari malformation *Postoperative changes as  described include right-sided laminectomies from C4 through C6 with  internal fixation. *Narrowing of intervertebral disc spaces without  measurable canal stenosis      THIS EXAMINATION WAS INTERPRETED AT [facility] [place]      Signed by: Grant Molina 11/9/2023 11:23 AM  Dictation workstation:   [id number]   Study Result    Narrative & Impression   MRN: [id number]  Patient Name: JUNE QUINN     STUDY:  MRI L-SPINE WO;  9/8/2022 8:02 am     INDICATION:  low back pain  M51.26: Lumbar herniated disc M54.16: Lumbar  radiculopathy, right M48.061: Lumbar  stenosis.     COMPARISON:  Lumbar spine x-rays dated 09/01/2022     ACCESSION NUMBER(S):  [id number]     ORDERING CLINICIAN:  JASON KIRKLAND     TECHNIQUE:  Sagittal T1, T2, STIR, axial T1 and T2 weighted images of the lumbar  spine were acquired.     FINDINGS:  There are 5 lumbar type vertebral bodies with the last well-formed  disc space labeled L5-S1.     Alignment: There is straightening of the normal lumbar lordosis with  grade 1 retrolisthesis of L2 on L3, L3 on L4, and L4 on L5 measuring  3-4 mm.     Vertebra/intervertebral discs: Vertebral body heights are maintained.  Multilevel fatty endplate degenerative signal changes are present.  There is associated STIR hyperintense signal involving the L3-S1  vertebral bodies relating to a component of marrow edema. A rounded  T1 and T2 hyperintense focus is seen within the L4 vertebral body,  likely relating to a small vertebral hemangioma. There is moderate to  severe intervertebral disc height loss at L4-5 with more mild to  moderate multilevel disc height loss throughout the remaining lumbar  spine.     Conus medullaris: The conus medullaris terminates at the L1 level.     T12-L1: No spinal canal or neural foraminal narrowing.     L1-2: Posterior disc osteophyte complex along with facet and  ligamentum flavum hypertrophy contribute to minimal narrowing of the  central spinal canal. There is mild-to-moderate right and no  significant left neural foraminal narrowing.     L2-3: There is mild central spinal canal narrowing secondary to a  posterior disc osteophyte complex along with facet and ligamentum  flavum hypertrophy. There is moderate left and mild right neural  foraminal narrowing.     L3-4: There is severe central spinal canal narrowing secondary to  components of a posterior disc osteophyte complex along with facet  and ligamentum flavum hypertrophy. There is moderate bilateral neural  foraminal narrowing, left greater than right.     L4-5: There is moderate to  severe central spinal canal narrowing  secondary to components of a posterior disc osteophyte complex along  with facet and ligamentum flavum hypertrophy. There is moderate right  and mild-to-moderate left neural foraminal narrowing.     L5-S1: There is a central disc protrusion superimposed upon a  posterior disc osteophyte complex with associated T2 hyperintense  signal seen centrally within the disc relating to an annular fissure.  There is no significant central spinal canal narrowing. There is  bilateral facet hypertrophy contributing to severe right and moderate  to severe left neural foraminal narrowing.     Paravertebral and posterior paraspinal soft tissues are unremarkable.     IMPRESSION:  Multilevel lumbar spondylosis as detailed above, most prominent at  L3-4 where there is severe central spinal canal and moderate  bilateral neural foraminal narrowing.     I personally reviewed the images/study and I agree with the findings  as stated. This study was interpreted at [facility], [place], [place].       Review of Systems   Constitutional: Negative.    HENT: Negative.     Eyes: Negative.    Respiratory: Negative.     Cardiovascular: Negative.    Gastrointestinal: Negative.    Endocrine: Negative.    Genitourinary: Negative.    Musculoskeletal:  Positive for arthralgias, back pain, gait problem, myalgias and neck pain. Negative for joint swelling and neck stiffness.   Skin: Negative.    Allergic/Immunologic: Negative.    Neurological:  Negative for dizziness, tremors, weakness, light-headedness, numbness and headaches.   Hematological: Negative.    Psychiatric/Behavioral: Negative.         Objective   Physical Exam  Vitals reviewed.   Constitutional:       Appearance: Normal appearance.   HENT:      Head: Normocephalic and atraumatic.   Eyes:      Conjunctiva/sclera: Conjunctivae normal.   Cardiovascular:      Pulses: Normal pulses.   Pulmonary:      Effort: Pulmonary  effort is normal. No respiratory distress.   Musculoskeletal:         General: No swelling.      Right lower leg: No edema.      Left lower leg: No edema.   Skin:     General: Skin is warm and dry.      Capillary Refill: Capillary refill takes 2 to 3 seconds.   Neurological:      General: No focal deficit present.      Mental Status: He is alert and oriented to person, place, and time.      Cranial Nerves: No cranial nerve deficit.      Sensory: No sensory deficit.      Motor: No weakness.      Gait: Gait normal.   Psychiatric:         Mood and Affect: Mood normal.         Behavior: Behavior normal.         Abdulaziz was seen today for back pain and neck pain.  Diagnoses and all orders for this visit:  Lumbar disc disease with radiculopathy  -     oxyCODONE (Roxicodone) 5 mg immediate release tablet; Take 1 tablet (5 mg) by mouth 3 times a day as needed for severe pain (7 - 10). Do not fill before October 14, 2024.  -     oxyCODONE (Roxicodone) 5 mg immediate release tablet; Take 1 tablet (5 mg) by mouth 3 times a day as needed for severe pain (7 - 10). Do not fill before November 11, 2024.  -     oxyCODONE (Roxicodone) 5 mg immediate release tablet; Take 1 tablet (5 mg) by mouth 3 times a day as needed for severe pain (7 - 10) for up to 28 days. Do not fill before December 9, 2024.  Lumbar radiculopathy, right  -     oxyCODONE (Roxicodone) 5 mg immediate release tablet; Take 1 tablet (5 mg) by mouth 3 times a day as needed for severe pain (7 - 10). Do not fill before October 14, 2024.  -     oxyCODONE (Roxicodone) 5 mg immediate release tablet; Take 1 tablet (5 mg) by mouth 3 times a day as needed for severe pain (7 - 10). Do not fill before November 11, 2024.  -     oxyCODONE (Roxicodone) 5 mg immediate release tablet; Take 1 tablet (5 mg) by mouth 3 times a day as needed for severe pain (7 - 10) for up to 28 days. Do not fill before December 9, 2024.  Lumbar postlaminectomy syndrome  -     oxyCODONE (Roxicodone) 5 mg  immediate release tablet; Take 1 tablet (5 mg) by mouth 3 times a day as needed for severe pain (7 - 10). Do not fill before October 14, 2024.  -     oxyCODONE (Roxicodone) 5 mg immediate release tablet; Take 1 tablet (5 mg) by mouth 3 times a day as needed for severe pain (7 - 10). Do not fill before November 11, 2024.  -     oxyCODONE (Roxicodone) 5 mg immediate release tablet; Take 1 tablet (5 mg) by mouth 3 times a day as needed for severe pain (7 - 10) for up to 28 days. Do not fill before December 9, 2024.  Cervical spondylosis with myelopathy  -     oxyCODONE (Roxicodone) 5 mg immediate release tablet; Take 1 tablet (5 mg) by mouth 3 times a day as needed for severe pain (7 - 10). Do not fill before October 14, 2024.  -     oxyCODONE (Roxicodone) 5 mg immediate release tablet; Take 1 tablet (5 mg) by mouth 3 times a day as needed for severe pain (7 - 10). Do not fill before November 11, 2024.  -     oxyCODONE (Roxicodone) 5 mg immediate release tablet; Take 1 tablet (5 mg) by mouth 3 times a day as needed for severe pain (7 - 10) for up to 28 days. Do not fill before December 9, 2024.  Use of opiates for therapeutic purposes  -     naloxone (Narcan) 4 mg/0.1 mL nasal spray; Administer 1 spray (4 mg) into affected nostril(s) if needed for opioid reversal.      Follow-up 12 weeks.    BERTRAND Ghosh 10/07/24 10:58 AM

## 2024-11-25 ENCOUNTER — APPOINTMENT (OUTPATIENT)
Dept: PRIMARY CARE | Facility: CLINIC | Age: 49
End: 2024-11-25
Payer: COMMERCIAL

## 2024-11-25 VITALS
DIASTOLIC BLOOD PRESSURE: 86 MMHG | WEIGHT: 227 LBS | SYSTOLIC BLOOD PRESSURE: 126 MMHG | OXYGEN SATURATION: 95 % | BODY MASS INDEX: 30.79 KG/M2 | HEART RATE: 74 BPM

## 2024-11-25 DIAGNOSIS — F17.200 CURRENT SMOKER: ICD-10-CM

## 2024-11-25 DIAGNOSIS — L40.9 PSORIASIS: ICD-10-CM

## 2024-11-25 DIAGNOSIS — I10 BENIGN ESSENTIAL HYPERTENSION: ICD-10-CM

## 2024-11-25 DIAGNOSIS — H66.001 NON-RECURRENT ACUTE SUPPURATIVE OTITIS MEDIA OF RIGHT EAR WITHOUT SPONTANEOUS RUPTURE OF TYMPANIC MEMBRANE: Primary | ICD-10-CM

## 2024-11-25 DIAGNOSIS — F41.1 GENERALIZED ANXIETY DISORDER: ICD-10-CM

## 2024-11-25 PROCEDURE — 99214 OFFICE O/P EST MOD 30 MIN: CPT | Performed by: FAMILY MEDICINE

## 2024-11-25 PROCEDURE — 3074F SYST BP LT 130 MM HG: CPT | Performed by: FAMILY MEDICINE

## 2024-11-25 PROCEDURE — 3079F DIAST BP 80-89 MM HG: CPT | Performed by: FAMILY MEDICINE

## 2024-11-25 RX ORDER — AMOXICILLIN AND CLAVULANATE POTASSIUM 875; 125 MG/1; MG/1
875 TABLET, FILM COATED ORAL 2 TIMES DAILY
Qty: 20 TABLET | Refills: 0 | Status: SHIPPED | OUTPATIENT
Start: 2024-11-25 | End: 2024-12-05

## 2024-11-25 RX ORDER — LORAZEPAM 1 MG/1
TABLET ORAL
Qty: 35 TABLET | Refills: 2 | Status: SHIPPED | OUTPATIENT
Start: 2024-12-16 | End: 2025-02-25

## 2024-11-25 NOTE — PATIENT INSTRUCTIONS
Dermatologists -      Dermatology -   [phone] DERM   [facility] - in [place] -   [phone]   DR Holden - [facility] - [phone]   [place] :   [place] (DR [facility]gow[facility])  - [phone]                   [place] [phone]  [facility]  - [place] - [phone]         Call your insurance and ask about Cologuard testing or any screening for colon cancer.         Lets try Augmentin twice a day for 10 days for the ear infection..      Appt every 3 months       You have been given  prescription(s) for a controlled medication.   This/these medications  can be dangerous if not taken exactly as directed.    You have filled out a controlled medication contract, and it is very important to abide by the contract in order for me to continue to prescribe these medications for you.  You must take these medications as directed, you cannot let anyone else take any of these medications.  You have to keep them in a safe place to protect other people or animals from getting into them.     I cannot refill them if you request a refill earlier than expected.   You cannot get refills for these medications on weekends or after office hours.   You must make sure you have a visit with me every 3 months in order for me to continue to prescribe this/these medications.   Its important you are sure to ask me if you have any questions about our policies on these medications or the medications themselves.    Please bring your bottles of any remaining medications with you to any appointment where you will be needing refills of these medications.     You can be called at any time to come in for a drug screen and/or a pill count if we feel it is necessary.   If we do call you for this, you would need to come into the office within 24 hours of our call.         Please work on quitting smoking -  this is one of the best things you can do for your health.   If your insurance will pay for any nicotine patches or other  "nicotine replacement products let me know and I can send in prescriptions.   Some people can take medications for helping to quit smoking - be sure to ask me about that if you are interested in trying them.    The [place] Quit line is helpful for support -  1 - 800 QUIT NOW   Trying to gradually cut back on your smoking is usually very helpful  -   Smoke one less a week , cutting back every week     Change all your habits around your smoking.   For example - if you smoke first thing in the morning -   Delay that first cigarette by 30 min - just to try to change the habit.   IF you smoke in your car - have  it deep cleaned to help you not smoke in the car.     YOU HAVE SET A QUIT DATE OF, Neri it on the calendar  - make it a day to remember!!            For General Healthy Nutrition    (Remember - NOT A DIET!   Diets are only good for class reunions.)    These are my general good nutrition recommendations for most people.   I use the term \" diet \"  in these instructions to mean your overall nutrition - how you eat and drink.   If we talked about something different during your visit with me,  other than what is written below,  follow that advice instead.       For most people,  eating healthier means getting less added sugar and less processed foods in your diet    The fresher the better.    Added sugar is now a part of the nutrition label on manufactured food, so you can keep an eye on it easier.    But basically,  foods and beverages  that contain regular sugar and corn syrup are the main sources of added sugars.  Eating as little of these foods as you can is best.   One shocking example of the epidemic of added sugar is soda.    One can of regular soda contains about as much added sugar as 3 regular size doughnuts!     The other issue with processed foods is the amount of processed grains they contain , such as white flour.    This is also something you want to try to limit in your diet.     But, grain products are " very important for your nutrition.    Whole grains are better for your body.     Cutting back on white breads, traditional pasta, baked goods, white rice,  and processed cereals will be healthier for you.   The better choices include whole grain breads,  whole wheat pasta,  brown rice, quinoa, barley, steel cut  or rolled oats.   If you eat cereal for breakfast, try to look for one made with whole grains and less sugar.   There are many people who have a problem with gluten, for a large variety of reasons.    Generally,  products made with wheat flour , barley or rye are the primary source of gluten.       Cutting back on saturated fats is important.    You want the majority of the meat that you eat to be chicken, fish or turkey.   Baked or broiled is best -  fried adds too much fat.    There are healthy fats that are important - fat is important for holding down appetite, vitamin absorption and several metabolic processes in the body.  Monounsaturated fats raise HDL (good cholesterol) and lower LDL (bad cholesterol).   Olive oil, peanut oil, nuts, seeds, and avocados are great sources of the good fats.       Ideas are:   Trade sour cream dip for hummus (which is rich in olive oil) or guacamole; use veggies or whole-wheat chips to dip.    Nuts are an excellent source of protein and healthy fats.   Tree nuts are the best kind, such as almonds or walnuts.   Just be careful - they are high in calories, so stick to a serving size.  (Most are about 200 calories for a 1/4 cup)      Proteins are very important for your body, and they also hold down your appetite.   Try to have protein with every meal.    These generally are meats, nuts, many beans, legumes, eggs, and dairy.   You will find protein in whole grain products and some green vegetables have a little too.     When you have dairy (if you can - many people are lactose intolerant) try to make it low fat.    Ideas are 1% milk, lowfat yogurt or cheeses, low fat  "cottage cheese.   I don't generally recommend FAT FREE because they often contain artificial products to improve taste, and the fat helps hold down your appetite.   If you are lactose intolerant, try to see if taking Lactaid before having dairy helps.      Fresh fruits and vegetables are VERY important.  The brighter the better.   Many vegetables are considered \"Free Foods\" - meaning you can eat as much as you want, and it does not matter.  These include tomatoes, cucumbers, celery, peppers, all the various lettuces and kale - to name a few.   Potatoes, corn and peas are starchy, so do have more calories, but are still healthy - you just want to watch the amount of them you eat.       Fruits are full of wonderful nutrition.   They contain natural sugar called fructose, so eating them in moderation is best.   Diabetics may need to pay careful attention to how their body reacts to the sugar.  Some fruits might drastically increase their blood sugar.      Eating smaller meals with a couple of small snacks is better for your metabolism than not eating for long amounts of time  (breakfast is very important).   Trying to avoid large meals is helpful too.    Eating like this helps keep your appetite down and keeps you in burning metabolism rather than storage metabolism so your body will use the calories you eat.       I do not tell people to stop eating sweets or snack foods - just limit the amounts you have.  The less the better.   Pay attention to serving sizes, and treat them as a treat.        Foods like doughnuts, pop tarts, sugar cereals, cookies  ARE NOT GOOD FOR BREAKFAST.   They are loaded with sugar and will cause you to be hungrier in the day and often not feel well.    Caffeine needs to be limited - no more than 2 servings a day.  Some people can't have any at all.    (if you have any sleep or anxiety issues - stop the caffeine)   Coffee, many teas, many sodas, energy drinks, almost any diet supplement,  and " chocolate all contain caffeine.      Water is important.   For most people, 8   x  8 ounces  a day are needed.  This may vary for some health issues.    If you need to be on a low sodium diet, that means looking at labels and eating only 1000 - 2000 mg of sodium a day.    Calcium intake is important.  3 servings of a high calcium food or drink a day is recommended.   This is usually a cup of milk, a cup of yogurt, an ounce of a hard cheese or 1.5 ounces of a soft cheese are the usual servings.   There are other high calcium foods - including soy or almond milk, broccoli,  almonds, dark green leafy vegetable.   Make sure you are not getting more than 1000  - 1200 mg of total calcium a day (unless you have been told you need more by a doctor).    Vitamin D 3 is important to absorb the calcium and for your immune system.   For children, 400 IU a day is recommended.   For adults - 800 - 5000 IU a day  is recommended.  (Often the amount needed is individualized for adults - be sure to ask how much is right for you)    Physical activity is very important for good health.    Finding activities that give you regular exercise is very important for good health.  Try to find exercise you enjoy doing on a regular basis.    30 minutes at least 5 days a week of a good cardiovascular exercise is recommended.   That means something that gets your heart rate going faster than your usual baseline and you can find yourself breathing harder than usual while you are exercising.  If you have not done any exercise in a long time,  make sure you ask if its safe for you to start,  and be sure to gradually work up to your goal.      If you need to lose weight,  following these recommendations will help you.   And if you are doing all of this and still not losing weight, then its likely just the amount of food you are eating.   Learn to cut back on portion sizes.  Using smaller plates may help.  Healthy weight loss is  only about a pound a  week.   You have to remember that whatever you do to lose the weight, you must be prepared to keep it up for life for the weight to stay off.     A lot of people have a lot of luck with using something like a fit bit,  or a program where you keep track of all of your calories that you eat and what you burn off in the day.

## 2024-11-25 NOTE — PROGRESS NOTES
Subjective   Patient ID: Abdulaziz Ortez is a 49 y.o. male who presents for Follow-up (He also is having ear drainage from both ears following some ear pain.).    HPI     LOV 8/2024   I did give him info to check his veins due to his swelling     Updates and Concerns:     Ears have started to drain and sound like head is hallow  -   Did have pain first     Quit lasix  - not helping     Did not make appt with vein specialists    Would like to see DERM for psoriasis     Quit the stressful job - too bored.     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        Chronic issues reviewed today:            HTN -   Losartan 25 mg a day ,   Hydrochlorothiazide 12.5 mg a day   (Propranolol dropped pulse too low)       Swelling often   Echo Aug 2023 - WNL       Lab 2/2024 - BMP fine   Declines stockings   Lasix did not work        Severe Generalized Anxiety  -     Last RX filled #35  on   9/16/24     Long history  of anxiety   Taking Lorazepam 1 mg hs and occas 1/2 in the day   HE STATES HE GOES INTO A PANIC ATTACK IF HE SEES HE IS CLOSE TO RUNNING OUT   I have tried to wean down several times - gets MUCH worse when I do -   Have at least been able to get him down to just one a day     Also on Citalopram 30 mg a day and   Aripiprazole 5 mg a day -   has been on several meds for anxiety in the past - these meds have helped him the most       Has tried propranolol and hydroxyzine - does not do well with either      I have personally reviewed the [place] report for this person. I find it to be appropriate.   I have considered the risk of abuse, dependence, addiction and diversion.  I believe that it is clinically appropriate for this person to be prescribed this medication for the documented diagnoses.   [place] report was reviewed on any day a prescription is written for a controlled substance.        Overdose risk score of  520   Contract - updated - electronic 5/3/23 - discussed today  - updated today  11/25/24  UDS  - done with pain  management (will not perform again due to cost)  - last one 7/18/24      JANETTE - 7  -not done today      We have discussed the great risk of narcotics with benzos         For herniated disc and DDD of spine -   DR Riley - partial C3 laminectomy and C4 - 6 laminoplasty on 12/11/2020 12/16/22 - Lumbar L3- decompressive laminectomy     Has significant chronic pain upper back and low back   CANNOT HAVE STEROIDS - TERRIBLE REACTION IN THE PAST -psychiatric     (will not even try topical - OTC cortisone even effects him)      trying to eat a low amount of sugar and gluten              Pain Management - seeing DR Gale  Use to see  DR Angie Tavares - pain management   Did see DR Knight ([facility]he[facility]) - she does not feel he has psoriatic arthritis   (Does have psoriasis)     Pain meds - Oxycodone 5 mg TID with 2 Tylenol       No NSAIDS      smoking - down to  5   - 6   cig a day  -     About the same      Declines vaccines despite my continued encouragement       Urinating every hour  - one year   Does have dribbling at times  No dysuria   Nocturia 1 -2  times has  Does not want medication now   PSA  8/2023 -less than one     Psoriasis  - allergic to steroids - even cream            CRC -  no colonoscopy yet  No colon cancer in the family   Declines testing at this time - worried about coverage    Review of Systems    Objective   /86 (BP Location: Left arm, Patient Position: Sitting, BP Cuff Size: Large adult)   Pulse 74   Wt 103 kg (227 lb)   SpO2 95%   BMI 30.79 kg/m²     Physical Exam  Vitals reviewed.   Constitutional:       General: He is not in acute distress.     Appearance: Normal appearance.   HENT:      Head: Normocephalic and atraumatic.      Ears:      Comments: Right TM red and inflamed   Psoriasis on ear canals bilat      Nose: Nose normal.      Mouth/Throat:      Mouth: Mucous membranes are moist.      Pharynx: No posterior oropharyngeal erythema.   Eyes:      Extraocular Movements: Extraocular movements  intact.      Conjunctiva/sclera: Conjunctivae normal.      Pupils: Pupils are equal, round, and reactive to light.   Cardiovascular:      Rate and Rhythm: Normal rate and regular rhythm.      Heart sounds: Normal heart sounds. No murmur heard.  Pulmonary:      Effort: Pulmonary effort is normal. No respiratory distress.      Breath sounds: Normal breath sounds. No wheezing.   Musculoskeletal:         General: Swelling (trace) present.      Cervical back: No rigidity.   Lymphadenopathy:      Cervical: No cervical adenopathy.   Skin:     General: Skin is warm and dry.      Findings: No rash.      Comments: Elbow with diffuse patches of white plaques    Neurological:      General: No focal deficit present.      Mental Status: He is alert. Mental status is at baseline.   Psychiatric:         Mood and Affect: Mood normal.         Thought Content: Thought content normal.         Assessment/Plan   Problem List Items Addressed This Visit          High    Anxiety disorder    Relevant Medications    LORazepam (Ativan) 1 mg tablet (Start on 12/16/2024)    Current smoker       Medium    Psoriasis    Benign essential hypertension     Other Visit Diagnoses       Non-recurrent acute suppurative otitis media of right ear without spontaneous rupture of tympanic membrane    -  Primary    Relevant Medications    amoxicillin-pot clavulanate (Augmentin) 875-125 mg tablet              Long history of anxiety - see above -   Doing well with current amount of Lorazepam   Contract updated today     Ear looks infected -   Cannot have ANY kind of steroids   Reacts even to topical formulations     For swelling - discussed seeing vein specialist -   Holding off at this time     Seeing pain management     Very aware of risk of Benzo and Opioids     HTN -better controlled     And having LUTS - did not want med yet     Psoriasis - gave him derm numbers     CRC screen - declined at this time -   I discussed colonoscopy again and cologuard - he is  to talk to his insurance     We discussed at visit any disease processes that were of concern as well as the risks, benefits and instructions of any new medication provided.    See orders and discussion section for information handed to patient on their Clinical Summary.   Patient (and/or caretaker of patient if present)  stated all questions were answered, and they voiced understanding of instructions.

## 2024-12-13 DIAGNOSIS — F41.1 GENERALIZED ANXIETY DISORDER: ICD-10-CM

## 2024-12-13 RX ORDER — LORAZEPAM 1 MG/1
TABLET ORAL
Qty: 35 TABLET | Refills: 2 | Status: SHIPPED | OUTPATIENT
Start: 2024-12-13 | End: 2025-02-25

## 2024-12-20 ENCOUNTER — OFFICE VISIT (OUTPATIENT)
Dept: PAIN MEDICINE | Facility: CLINIC | Age: 49
End: 2024-12-20
Payer: COMMERCIAL

## 2024-12-20 VITALS
OXYGEN SATURATION: 95 % | HEART RATE: 88 BPM | SYSTOLIC BLOOD PRESSURE: 143 MMHG | DIASTOLIC BLOOD PRESSURE: 90 MMHG | RESPIRATION RATE: 16 BRPM

## 2024-12-20 DIAGNOSIS — M96.1 POSTLAMINECTOMY SYNDROME, CERVICAL REGION: Primary | ICD-10-CM

## 2024-12-20 DIAGNOSIS — Q07.00 ARNOLD-CHIARI SYNDROME WITHOUT SPINA BIFIDA OR HYDROCEPHALUS (MULTI): ICD-10-CM

## 2024-12-20 DIAGNOSIS — M51.16 LUMBAR DISC DISEASE WITH RADICULOPATHY: ICD-10-CM

## 2024-12-20 DIAGNOSIS — Z98.1 S/P CERVICAL SPINAL FUSION: ICD-10-CM

## 2024-12-20 DIAGNOSIS — M54.16 LUMBAR RADICULOPATHY, RIGHT: ICD-10-CM

## 2024-12-20 DIAGNOSIS — M96.1 LUMBAR POSTLAMINECTOMY SYNDROME: ICD-10-CM

## 2024-12-20 PROCEDURE — 99213 OFFICE O/P EST LOW 20 MIN: CPT | Performed by: NURSE PRACTITIONER

## 2024-12-20 PROCEDURE — 3080F DIAST BP >= 90 MM HG: CPT | Performed by: NURSE PRACTITIONER

## 2024-12-20 PROCEDURE — 3077F SYST BP >= 140 MM HG: CPT | Performed by: NURSE PRACTITIONER

## 2024-12-20 RX ORDER — OXYCODONE HYDROCHLORIDE 5 MG/1
5 TABLET ORAL 3 TIMES DAILY PRN
Qty: 84 TABLET | Refills: 0 | Status: SHIPPED | OUTPATIENT
Start: 2025-01-10 | End: 2025-02-07

## 2024-12-20 RX ORDER — OXYCODONE HYDROCHLORIDE 5 MG/1
5 TABLET ORAL 3 TIMES DAILY PRN
Qty: 84 TABLET | Refills: 0 | Status: SHIPPED | OUTPATIENT
Start: 2025-01-07 | End: 2025-02-04

## 2024-12-20 RX ORDER — OXYCODONE HYDROCHLORIDE 5 MG/1
5 TABLET ORAL 3 TIMES DAILY PRN
Qty: 84 TABLET | Refills: 0 | Status: SHIPPED | OUTPATIENT
Start: 2025-03-07

## 2024-12-20 ASSESSMENT — ENCOUNTER SYMPTOMS
LIGHT-HEADEDNESS: 0
NUMBNESS: 0
ARTHRALGIAS: 1
NECK STIFFNESS: 0
PSYCHIATRIC NEGATIVE: 1
EYES NEGATIVE: 1
NECK PAIN: 1
DIZZINESS: 0
JOINT SWELLING: 0
CONSTITUTIONAL NEGATIVE: 1
HEADACHES: 0
MYALGIAS: 1
GASTROINTESTINAL NEGATIVE: 1
TREMORS: 0
ALLERGIC/IMMUNOLOGIC NEGATIVE: 1
ENDOCRINE NEGATIVE: 1
BACK PAIN: 1
HEMATOLOGIC/LYMPHATIC NEGATIVE: 1
RESPIRATORY NEGATIVE: 1
CARDIOVASCULAR NEGATIVE: 1
WEAKNESS: 0

## 2024-12-20 ASSESSMENT — PAIN - FUNCTIONAL ASSESSMENT: PAIN_FUNCTIONAL_ASSESSMENT: 0-10

## 2024-12-20 ASSESSMENT — PAIN SCALES - GENERAL: PAINLEVEL_OUTOF10: 5 - MODERATE PAIN

## 2024-12-20 ASSESSMENT — PAIN DESCRIPTION - DESCRIPTORS: DESCRIPTORS: SHARP

## 2024-12-20 NOTE — PROGRESS NOTES
Subjective   Patient ID: Abdulaziz Ortez is a 49 y.o. male who presents for chronic pain management.    Back Pain  Pertinent negatives include no headaches, numbness or weakness.   Neck Pain   Pertinent negatives include no headaches, numbness or weakness.       Abdulaziz follows up for interval reevaluation of his chronic cervical pain from spondylosis and with history of posterior cervical spinal fusion. He is also with low back pain from lumbar disc herniation creating lumbar stenosis and right greater than left lower extremity radiculitis. He is also with sacroiliac and hip pain from arthritis. History of L3-L5 decompressive lumbar surgery 2022.     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     Percocet 5 mg to take up to 3 times a day to help with his pain. Medication can control pain up to 50%. Average pain score with medication is 5 out of 10.     Has tried muscle relaxers and these have not helped reduce between the shoulder blade pain. Cannot have prednisone as it is side effect of psychosis. Has been on gabapentin on high doses and this has not helped in the past. Does have a TENS unit that he can use. Advised that he can use this daily as he needs. Also discussed over-the-counter medications of acetaminophen and ibuprofen. Does have stomach upset from ibuprofen.      Better able to maintain full-time employment with medication. Has a below average quality family and social life with current condition and treatment secondary to pain.     Toxicology consistent July 16, 2024.  Annual controlled substance agreement and opioid risk tool are completed and scanned into the chart October 7, 2024.     For continuity:   Given the patient's report of reduced pain and improved functional ability without adverse effects, it is reasonable to treat with narcotic medications. The terms of the opioid agreement as well as the potential risks and adverse effects of the patient's  medication regimen were discussed in detail. This includes if applicable due to dosage of medication permission to discuss and coordinate care with other treatment providers relevant to the patients condition. The patient verbalized understanding.      Risks and side effects of chronic opioid therapy including but not limited to tolerance, dependence, constipation, hyperalgesia, cognitive side effects, addiction and possible death due to overuse and or misuse were discussed. I also discussed that such medications when co-administered with other sedative agents including but not limited to alcohol, benzodiazepines, sedative hypnotics and illegal drugs could pose life threatening consequences including death. I also explained the impact that the administration of such medication has on a patient with obstructive sleep apnea and continued recommendations for use of apnea devices if ordered are prescribed by other physicians. In order to effectively and safely treat the pain, I also emphasized the importance of compliance with the treatment plan, as well as compliance with the terms of the opioid agreement, which was reviewed in detail. I explained the importance of being responsible with the medications and to take these only as prescribed, never in excess and never for reasons other than pain reduction. The patient was counseled on keeping the medications safe and locked away from children and other adults as well as disposal methods and options. The patient understood the risks and instructions.      I also discussed with the patient in detail that based on the clinical response to the opioid medications and improvements of activities of daily living, sleep, and work performance in light of compliance with the treatment plan we can continue this form of therapy for the above chronic pain. The goal and rationale used for current treatment with chronic opioid medication is to control the pain and alleviate disability  induced by the chronic pain condition noted above after failures of other non-opioid and nonpharmacological modalities to treat the chronic pain and the symptoms associated with have failed. The patient understood the goals in terms of the above treatment plan and had no further questions prior to leaving the office today.      Of note, the above-mentioned diagnoses/conditions and expected fluctuating nature of pain, and pain characteristic changes may lead to prolonged functional impairment requiring frequent and multiple reassessments with continued high level medical decision making. As noted, medication and medication management may require opiate therapy in excess of a routine less than 30 day medication requirement. The patient may require daily opiate therapy necessitating month-long prescription medication as noted above in order to perform activities of daily living and achieve acceptable quality of life with respect to their chronic pain condition for the foreseeable future. We monitor our patient's carefully through drug monitoring, medication counts, urine drug testing specific to their medication as well as a myriad of other substances and with frequent follow-ups with interval reassement of the chronic pain condition, its pathophysiology and prognosis.      The level of clinical decision making at this office visit is high due to high risks and complications including mortality and morbidity related to acute and chronic pain with respects to life, bodily function, and treatment. Risks and clinical decisions with respect to under treatment, failure to maintain adequate treatment, and/or overtreatment complications and outcomes were discussed with the patient with respect to their chronic pain conditions, interventional therapies, as well as the use of various medications including possible controlled/dangerous medications. The amount and complexity of reviewed data at this in subsequent office visits is  high given patient's fluctuating clinical presentation, laboratory and radiographic reports, prescription monitoring program data, and medication history as well as other relevant data as noted above. Pertinent negatives and positives data was used in consideration for the above-mentioned high complexity.       Given the patient's total MED, general use of daily opiates, or other coadministered medications in various classes the patient was offered a prescription for Narcan. I instructed the patient that it is important that patient fill this medication in order to demonstrate understanding of the gravity of possible side effects including respiratory depression and risk of overdose of this opiate load or medication combination. As such patient will be required to bring Narcan prescription to follow-up appointments as part of compliance with continued opiate care.      With respect to opiate induced constipation I discussed multiple ways to combat this problem including staying hydrated and taking over-the-counter medications such as Dulcolax, Miralax and Senna. If these treatments are not effective we could consider such medications as Amitiza, Linzess and Movantik.      Disclaimer: This note was transcribed using an audio transcription device. As such, minor errors may be present with regard to spelling, punctuation, and inadvertent word insertion. Please disregard such errors.           Narrative & Impression   Interpreted By:  Grant Molina,   STUDY:  MR CERVICAL SPINE WO IV CONTRAST;  11/9/2023 11:15 am      INDICATION:  Signs/Symptoms:NECK PAIN.      COMPARISON:  None.      ACCESSION NUMBER(S):  [id number]      ORDERING CLINICIAN:  AISHWARYA ZHANG      TECHNIQUE:  The cervical spine was studied in the sagittal and axial planes  utilizing T1 and T2 weighted images.      FINDINGS:  The craniovertebral junction is normal. There is hydromyelia within  the spinal cord from C2 through C4. The cord is normal in  size. There  is downward displacement and deformity of the cerebellar tonsils  consistent with Chiari malformation.. The marrow signal is normal.  Serial axial images reveal the following: C2/C3  There is normal alignment and vertebral body height. The disc space  is normal. There is no evidence of canal or foraminal narrowing.  There is no evidence of bulging or herniated disc. C3/C4  There is normal alignment and vertebral body height. The disc space  is normal. There is no evidence of canal or foraminal narrowing.  There is no evidence of bulging or herniated disc. C4/C5  Previous right-sided laminectomy with internal fixation. Bilateral  facet hypertrophy. Asymmetrical uncovertebral joint hypertrophy  toward the right. Focal right-sided foraminal narrowing without  measurable canal stenosis. C5/C6  Previous right-sided laminectomy with internal fixation. Bilateral  facet hypertrophy without canal or foraminal narrowing C6/C7  Previous right-sided laminectomy with internal fixation. Bilateral  facet hypertrophy without canal or foraminal narrowing C7/T1  There is normal alignment and vertebral body height. The disc space  is normal. There is no evidence of canal or foraminal narrowing.  There is no evidence of bulging or herniated disc.      IMPRESSION:  * Cerebellar tonsillar ptosis with hydromyelia. Findings are  consistent with Chiari malformation *Postoperative changes as  described include right-sided laminectomies from C4 through C6 with  internal fixation. *Narrowing of intervertebral disc spaces without  measurable canal stenosis      [place] [facility] [place] by: Grant Molina 11/9/2023 11:23 AM  Dictation workstation:   [id number]   Study Result    Narrative & Impression   MRN: [id number]  Patient Name: JUNE QUINN     STUDY:  MRI L-SPINE WO;  9/8/2022 8:02 am     INDICATION:  low back pain  M51.26: Lumbar herniated disc M54.16: Lumbar  radiculopathy, right M48.061: Lumbar  stenosis.     COMPARISON:  Lumbar spine x-rays dated 09/01/2022     ACCESSION NUMBER(S):  [id number]     ORDERING CLINICIAN:  JASON KIRKLAND     TECHNIQUE:  Sagittal T1, T2, STIR, axial T1 and T2 weighted images of the lumbar  spine were acquired.     FINDINGS:  There are 5 lumbar type vertebral bodies with the last well-formed  disc space labeled L5-S1.     Alignment: There is straightening of the normal lumbar lordosis with  grade 1 retrolisthesis of L2 on L3, L3 on L4, and L4 on L5 measuring  3-4 mm.     Vertebra/intervertebral discs: Vertebral body heights are maintained.  Multilevel fatty endplate degenerative signal changes are present.  There is associated STIR hyperintense signal involving the L3-S1  vertebral bodies relating to a component of marrow edema. A rounded  T1 and T2 hyperintense focus is seen within the L4 vertebral body,  likely relating to a small vertebral hemangioma. There is moderate to  severe intervertebral disc height loss at L4-5 with more mild to  moderate multilevel disc height loss throughout the remaining lumbar  spine.     Conus medullaris: The conus medullaris terminates at the L1 level.     T12-L1: No spinal canal or neural foraminal narrowing.     L1-2: Posterior disc osteophyte complex along with facet and  ligamentum flavum hypertrophy contribute to minimal narrowing of the  central spinal canal. There is mild-to-moderate right and no  significant left neural foraminal narrowing.     L2-3: There is mild central spinal canal narrowing secondary to a  posterior disc osteophyte complex along with facet and ligamentum  flavum hypertrophy. There is moderate left and mild right neural  foraminal narrowing.     L3-4: There is severe central spinal canal narrowing secondary to  components of a posterior disc osteophyte complex along with facet  and ligamentum flavum hypertrophy. There is moderate bilateral neural  foraminal narrowing, left greater than right.     L4-5: There is moderate to  severe central spinal canal narrowing  secondary to components of a posterior disc osteophyte complex along  with facet and ligamentum flavum hypertrophy. There is moderate right  and mild-to-moderate left neural foraminal narrowing.     L5-S1: There is a central disc protrusion superimposed upon a  posterior disc osteophyte complex with associated T2 hyperintense  signal seen centrally within the disc relating to an annular fissure.  There is no significant central spinal canal narrowing. There is  bilateral facet hypertrophy contributing to severe right and moderate  to severe left neural foraminal narrowing.     Paravertebral and posterior paraspinal soft tissues are unremarkable.     IMPRESSION:  Multilevel lumbar spondylosis as detailed above, most prominent at  L3-4 where there is severe central spinal canal and moderate  bilateral neural foraminal narrowing.     I personally reviewed the images/study and I agree with the findings  as stated. This study was interpreted at [facility], [place], [place].       Review of Systems   Constitutional: Negative.    HENT: Negative.     Eyes: Negative.    Respiratory: Negative.     Cardiovascular: Negative.    Gastrointestinal: Negative.    Endocrine: Negative.    Genitourinary: Negative.    Musculoskeletal:  Positive for arthralgias, back pain, gait problem, myalgias and neck pain. Negative for joint swelling and neck stiffness.   Skin: Negative.    Allergic/Immunologic: Negative.    Neurological:  Negative for dizziness, tremors, weakness, light-headedness, numbness and headaches.   Hematological: Negative.    Psychiatric/Behavioral: Negative.         Objective   Physical Exam  Vitals reviewed.   Constitutional:       Appearance: Normal appearance.   HENT:      Head: Normocephalic and atraumatic.   Eyes:      Conjunctiva/sclera: Conjunctivae normal.   Cardiovascular:      Pulses: Normal pulses.   Pulmonary:      Effort: Pulmonary  effort is normal. No respiratory distress.   Musculoskeletal:         General: No swelling.      Right lower leg: No edema.      Left lower leg: No edema.   Skin:     General: Skin is warm and dry.      Capillary Refill: Capillary refill takes 2 to 3 seconds.   Neurological:      General: No focal deficit present.      Mental Status: He is alert and oriented to person, place, and time.      Cranial Nerves: No cranial nerve deficit.      Sensory: No sensory deficit.      Motor: No weakness.      Gait: Gait normal.   Psychiatric:         Mood and Affect: Mood normal.         Behavior: Behavior normal.         Abdulaziz was seen today for back pain and neck pain.  Diagnoses and all orders for this visit:  Postlaminectomy syndrome, cervical region (Primary)  Lumbar disc disease with radiculopathy  -     oxyCODONE (Roxicodone) 5 mg immediate release tablet; Take 1 tablet (5 mg) by mouth 3 times a day as needed for severe pain (7 - 10) for up to 28 days. Do not fill before January 10, 2025.  -     oxyCODONE (Roxicodone) 5 mg immediate release tablet; Take 1 tablet (5 mg) by mouth 3 times a day as needed for severe pain (7 - 10) for up to 28 days. Do not fill before January 7, 2025.  -     oxyCODONE (Roxicodone) 5 mg immediate release tablet; Take 1 tablet (5 mg) by mouth 3 times a day as needed for severe pain (7 - 10). Do not fill before March 7, 2025.  Lumbar radiculopathy, right  -     oxyCODONE (Roxicodone) 5 mg immediate release tablet; Take 1 tablet (5 mg) by mouth 3 times a day as needed for severe pain (7 - 10) for up to 28 days. Do not fill before January 10, 2025.  -     oxyCODONE (Roxicodone) 5 mg immediate release tablet; Take 1 tablet (5 mg) by mouth 3 times a day as needed for severe pain (7 - 10) for up to 28 days. Do not fill before January 7, 2025.  -     oxyCODONE (Roxicodone) 5 mg immediate release tablet; Take 1 tablet (5 mg) by mouth 3 times a day as needed for severe pain (7 - 10). Do not fill before  March 7, 2025.  Lumbar postlaminectomy syndrome  -     oxyCODONE (Roxicodone) 5 mg immediate release tablet; Take 1 tablet (5 mg) by mouth 3 times a day as needed for severe pain (7 - 10) for up to 28 days. Do not fill before January 10, 2025.  -     oxyCODONE (Roxicodone) 5 mg immediate release tablet; Take 1 tablet (5 mg) by mouth 3 times a day as needed for severe pain (7 - 10) for up to 28 days. Do not fill before January 7, 2025.  -     oxyCODONE (Roxicodone) 5 mg immediate release tablet; Take 1 tablet (5 mg) by mouth 3 times a day as needed for severe pain (7 - 10). Do not fill before March 7, 2025.  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    JERSEY Ghosh-CNP 12/20/24 9:29 AM

## 2025-02-08 DIAGNOSIS — F41.1 GENERALIZED ANXIETY DISORDER: ICD-10-CM

## 2025-02-10 RX ORDER — CITALOPRAM 20 MG/1
TABLET, FILM COATED ORAL
Qty: 135 TABLET | Refills: 3 | Status: SHIPPED | OUTPATIENT
Start: 2025-02-10

## 2025-02-24 ENCOUNTER — APPOINTMENT (OUTPATIENT)
Dept: PRIMARY CARE | Facility: CLINIC | Age: 50
End: 2025-02-24
Payer: COMMERCIAL

## 2025-02-24 VITALS
OXYGEN SATURATION: 95 % | HEART RATE: 88 BPM | BODY MASS INDEX: 30.65 KG/M2 | SYSTOLIC BLOOD PRESSURE: 118 MMHG | WEIGHT: 226 LBS | DIASTOLIC BLOOD PRESSURE: 80 MMHG

## 2025-02-24 DIAGNOSIS — I10 BENIGN ESSENTIAL HYPERTENSION: ICD-10-CM

## 2025-02-24 DIAGNOSIS — L40.9 PSORIASIS: ICD-10-CM

## 2025-02-24 DIAGNOSIS — M47.12 CERVICAL SPONDYLOSIS WITH MYELOPATHY: ICD-10-CM

## 2025-02-24 DIAGNOSIS — F41.1 GENERALIZED ANXIETY DISORDER: ICD-10-CM

## 2025-02-24 DIAGNOSIS — F17.200 CURRENT SMOKER: ICD-10-CM

## 2025-02-24 DIAGNOSIS — M25.50 ARTHRALGIA OF MULTIPLE JOINTS: Primary | ICD-10-CM

## 2025-02-24 PROCEDURE — 3079F DIAST BP 80-89 MM HG: CPT | Performed by: FAMILY MEDICINE

## 2025-02-24 PROCEDURE — 99214 OFFICE O/P EST MOD 30 MIN: CPT | Performed by: FAMILY MEDICINE

## 2025-02-24 PROCEDURE — 3074F SYST BP LT 130 MM HG: CPT | Performed by: FAMILY MEDICINE

## 2025-02-24 RX ORDER — LORAZEPAM 1 MG/1
TABLET ORAL
Qty: 35 TABLET | Refills: 2 | Status: SHIPPED | OUTPATIENT
Start: 2025-03-13 | End: 2025-05-09

## 2025-02-24 ASSESSMENT — PATIENT HEALTH QUESTIONNAIRE - PHQ9
SUM OF ALL RESPONSES TO PHQ9 QUESTIONS 1 AND 2: 0
2. FEELING DOWN, DEPRESSED OR HOPELESS: NOT AT ALL
1. LITTLE INTEREST OR PLEASURE IN DOING THINGS: NOT AT ALL

## 2025-02-24 NOTE — ASSESSMENT & PLAN NOTE
He would love improvement - but cannot have any form or steroids,  and is very worried about the risk with immunosuppressants  -   Discussed tanning   (But, also has hx of SCC)   Strongly encouraged getting back into derm

## 2025-02-24 NOTE — ASSESSMENT & PLAN NOTE
"  Orders:    LORazepam (Ativan) 1 mg tablet; Take one daily as needed for anxiety ,  may take a second one at least 6 hours later in the day if needed up to 5 times in a month Do not fill before March 13, 2025.    Long hx of severe JANETTE -   Sx controlled now - but feels \"too numb\"  -   Going to try a little less Citalopram first -   If no better or anxiety gets worse -   Then will try 1/2 of Abilify     Keeps appt every 3 months     "

## 2025-02-24 NOTE — PATIENT INSTRUCTIONS
Try taking a little less Citalopram first -   20 mg daily   (taking away the extra 1/2)  -   See if the anxiety gets any worse - if it does - go back to 1.5 tabs a day.    If the anxiety does not get worse - see if cutting back makes you less numb.    In a few weeks - if that did not help at all -  try 1/2 of Abilify instead.          Try tanning for the psoriasis  -   Please consider getting back into dermatology   DR Banda - [phone]       You have been given  prescription(s) for a controlled medication.   This/these medications  can be dangerous if not taken exactly as directed.    You have filled out a controlled medication contract, and it is very important to abide by the contract in order for me to continue to prescribe these medications for you.  You must take these medications as directed, you cannot let anyone else take any of these medications.  You have to keep them in a safe place to protect other people or animals from getting into them.     I cannot refill them if you request a refill earlier than expected.   You cannot get refills for these medications on weekends or after office hours.   You must make sure you have a visit with me every 3 months in order for me to continue to prescribe this/these medications.   Its important you are sure to ask me if you have any questions about our policies on these medications or the medications themselves.    Please bring your bottles of any remaining medications with you to any appointment where you will be needing refills of these medications.     You can be called at any time to come in for a drug screen and/or a pill count if we feel it is necessary.   If we do call you for this, you would need to come into the office within 24 hours of our call.       Please work on quitting smoking -  this is one of the best things you can do for your health.   If your insurance will pay for any nicotine patches or other nicotine replacement products let me know  "and I can send in prescriptions.   Some people can take medications for helping to quit smoking - be sure to ask me about that if you are interested in trying them.    The [place] Quit line is helpful for support -  1 - 800 QUIT NOW   Trying to gradually cut back on your smoking is usually very helpful  -   Smoke one less a week , cutting back every week     Change all your habits around your smoking.   For example - if you smoke first thing in the morning -   Delay that first cigarette by 30 min - just to try to change the habit.   IF you smoke in your car - have  it deep cleaned to help you not smoke in the car.     YOU HAVE SET A QUIT DATE OF, Neri it on the calendar  - make it a day to remember!!            For General Healthy Nutrition    (Remember - NOT A DIET!   Diets are only good for class reunions.)    These are my general good nutrition recommendations for most people.   I use the term \" diet \"  in these instructions to mean your overall nutrition - how you eat and drink.   If we talked about something different during your visit with me,  other than what is written below,  follow that advice instead.       For most people,  eating healthier means getting less added sugar and less processed foods in your diet    The fresher the better.    Added sugar is now a part of the nutrition label on manufactured food, so you can keep an eye on it easier.    But basically,  foods and beverages  that contain regular sugar and corn syrup are the main sources of added sugars.  Eating as little of these foods as you can is best.   One shocking example of the epidemic of added sugar is soda.    One can of regular soda contains about as much added sugar as 3 regular size doughnuts!     The other issue with processed foods is the amount of processed grains they contain , such as white flour.    This is also something you want to try to limit in your diet.     But, grain products are very important for your nutrition.    Whole " grains are better for your body.     Cutting back on white breads, traditional pasta, baked goods, white rice,  and processed cereals will be healthier for you.   The better choices include whole grain breads,  whole wheat pasta,  brown rice, quinoa, barley, steel cut  or rolled oats.   If you eat cereal for breakfast, try to look for one made with whole grains and less sugar.   There are many people who have a problem with gluten, for a large variety of reasons.    Generally,  products made with wheat flour , barley or rye are the primary source of gluten.       Cutting back on saturated fats is important.    You want the majority of the meat that you eat to be chicken, fish or turkey.   Baked or broiled is best -  fried adds too much fat.    There are healthy fats that are important - fat is important for holding down appetite, vitamin absorption and several metabolic processes in the body.  Monounsaturated fats raise HDL (good cholesterol) and lower LDL (bad cholesterol).   Olive oil, peanut oil, nuts, seeds, and avocados are great sources of the good fats.       Ideas are:   Trade sour cream dip for hummus (which is rich in olive oil) or guacamole; use veggies or whole-wheat chips to dip.    Nuts are an excellent source of protein and healthy fats.   Tree nuts are the best kind, such as almonds or walnuts.   Just be careful - they are high in calories, so stick to a serving size.  (Most are about 200 calories for a 1/4 cup)      Proteins are very important for your body, and they also hold down your appetite.   Try to have protein with every meal.    These generally are meats, nuts, many beans, legumes, eggs, and dairy.   You will find protein in whole grain products and some green vegetables have a little too.     When you have dairy (if you can - many people are lactose intolerant) try to make it low fat.    Ideas are 1% milk, lowfat yogurt or cheeses, low fat cottage cheese.   I don't generally recommend FAT  "FREE because they often contain artificial products to improve taste, and the fat helps hold down your appetite.   If you are lactose intolerant, try to see if taking Lactaid before having dairy helps.      Fresh fruits and vegetables are VERY important.  The brighter the better.   Many vegetables are considered \"Free Foods\" - meaning you can eat as much as you want, and it does not matter.  These include tomatoes, cucumbers, celery, peppers, all the various lettuces and kale - to name a few.   Potatoes, corn and peas are starchy, so do have more calories, but are still healthy - you just want to watch the amount of them you eat.       Fruits are full of wonderful nutrition.   They contain natural sugar called fructose, so eating them in moderation is best.   Diabetics may need to pay careful attention to how their body reacts to the sugar.  Some fruits might drastically increase their blood sugar.      Eating smaller meals with a couple of small snacks is better for your metabolism than not eating for long amounts of time  (breakfast is very important).   Trying to avoid large meals is helpful too.    Eating like this helps keep your appetite down and keeps you in burning metabolism rather than storage metabolism so your body will use the calories you eat.       I do not tell people to stop eating sweets or snack foods - just limit the amounts you have.  The less the better.   Pay attention to serving sizes, and treat them as a treat.        Foods like doughnuts, pop tarts, sugar cereals, cookies  ARE NOT GOOD FOR BREAKFAST.   They are loaded with sugar and will cause you to be hungrier in the day and often not feel well.    Caffeine needs to be limited - no more than 2 servings a day.  Some people can't have any at all.    (if you have any sleep or anxiety issues - stop the caffeine)   Coffee, many teas, many sodas, energy drinks, almost any diet supplement,  and chocolate all contain caffeine.      Water is " important.   For most people, 8   x  8 ounces  a day are needed.  This may vary for some health issues.    If you need to be on a low sodium diet, that means looking at labels and eating only 1000 - 2000 mg of sodium a day.    Calcium intake is important.  3 servings of a high calcium food or drink a day is recommended.   This is usually a cup of milk, a cup of yogurt, an ounce of a hard cheese or 1.5 ounces of a soft cheese are the usual servings.   There are other high calcium foods - including soy or almond milk, broccoli,  almonds, dark green leafy vegetable.   Make sure you are not getting more than 1000  - 1200 mg of total calcium a day (unless you have been told you need more by a doctor).    Vitamin D 3 is important to absorb the calcium and for your immune system.   For children, 400 IU a day is recommended.   For adults - 800 - 5000 IU a day  is recommended.  (Often the amount needed is individualized for adults - be sure to ask how much is right for you)    Physical activity is very important for good health.    Finding activities that give you regular exercise is very important for good health.  Try to find exercise you enjoy doing on a regular basis.    30 minutes at least 5 days a week of a good cardiovascular exercise is recommended.   That means something that gets your heart rate going faster than your usual baseline and you can find yourself breathing harder than usual while you are exercising.  If you have not done any exercise in a long time,  make sure you ask if its safe for you to start,  and be sure to gradually work up to your goal.      If you need to lose weight,  following these recommendations will help you.   And if you are doing all of this and still not losing weight, then its likely just the amount of food you are eating.   Learn to cut back on portion sizes.  Using smaller plates may help.  Healthy weight loss is  only about a pound a week.   You have to remember that whatever you  do to lose the weight, you must be prepared to keep it up for life for the weight to stay off.     A lot of people have a lot of luck with using something like a fit bit,  or a program where you keep track of all of your calories that you eat and what you burn off in the day.

## 2025-02-24 NOTE — PROGRESS NOTES
Subjective   Patient ID: Abdulaziz Ortez is a 49 y.o. male who presents for Follow-up (3 month check up).    HPI     LOV 11/2024       Updates and Concerns:     Did not go see DERM for psoriasis -   Brother in Law went and told him all they have are steroids and immunosuppressive -   They do not like the risks.      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   Worried about his back -   No lifting     Still going to pain management -   Still on oxycodone TID -   Would like to find out from them about the non narcotic pain med that is coming out     Labs in Aug looked good       Chronic issues reviewed today:            HTN -   Losartan 25 mg a day ,   Hydrochlorothiazide 12.5 mg a day   (Propranolol dropped pulse too low)       Swelling  in legs often   Echo Aug 2023 - WNL       Lab 2/2024 - BMP fine   Declines stockings   Lasix did not help   Its been better lately - moving more        Severe Generalized Anxiety  -   Has been on Lorazepam for many years -   I have weaned him down to the lowest dose I can that still helps his anxiety   - and without it, gets much worse.     Last RX filled #35  2 R   on   12/13/2024   Taking Lorazepam 1 mg hs and occas 1/2 in the day     HE STATES HE GOES INTO A PANIC ATTACK IF HE SEES HE IS CLOSE TO RUNNING OUT   I have tried to wean down several times - gets MUCH worse when I do -       Also on Citalopram 30 mg a day and   Aripiprazole 5 mg a day -   has been on several meds for anxiety in the past - these meds have helped him the most    Since on Abilify  - feels restless   But he can tell it helps the anxiety     He is feeling a little too numb - feels like he does not care about anything     Has tried propranolol and hydroxyzine - does not do well with either      I have personally reviewed the [place] report for this person. I find it to be appropriate.   I have considered the risk of abuse, dependence, addiction and diversion.  I believe that it is clinically appropriate for this  person to be prescribed this medication for the documented diagnoses.   [place] report was reviewed on any day a prescription is written for a controlled substance.        Overdose risk score of  510    Contract - updated 11/25/24  UDS  - done with pain management (will not perform again due to cost)  - last one 7/18/24      JANETTE - 7  -  not done today   Anxiety at a zero today      We have discussed the great risk of narcotics with benzos  - he spaces them out.          Chronic severe back and neck pain:   herniated disc and DDD of spine -     DR Riley performed partial C3 laminectomy and C4 - 6 laminoplasty on 12/11/2020 12/16/22 - Lumbar L3- decompressive laminectomy     CANNOT HAVE STEROIDS - TERRIBLE REACTION IN THE PAST -psychiatric     (Cannot even use topical - OTC cortisone even effects him)      trying to eat a low amount of sugar and gluten              Pain Management - seeing DR Gale  Did see DR Knight ([facility]he[facility]) - she does not feel he has psoriatic arthritis   (Does have psoriasis)     Pain meds - Oxycodone 5 mg TID with 2 Tylenol       No NSAIDS      smoking - down to  5   - 6   cig a day  -     About the same      Declines vaccines despite my continued encouragement     BPH -   Polyuria   Does have dribbling at times  No dysuria   Nocturia 1 -2  times hs  Does not want medication now   PSA  8/2023 -less than one     Psoriasis  - allergic to steroids - even cream     Hx of SCC scalp 2021 - saw DR Banda            CRC -  no colonoscopy yet  No colon cancer in the family   Declines testing at this time - worried about coverage    Review of Systems    Objective   /80 (BP Location: Left arm, Patient Position: Sitting, BP Cuff Size: Large adult)   Pulse 88   Wt 103 kg (226 lb)   SpO2 95%   BMI 30.65 kg/m²     Physical Exam  Vitals reviewed.   Constitutional:       General: He is not in acute distress.     Appearance: Normal appearance.   HENT:      Head: Normocephalic and atraumatic.      Nose:  "Nose normal.      Mouth/Throat:      Mouth: Mucous membranes are moist.      Pharynx: No posterior oropharyngeal erythema.   Eyes:      Extraocular Movements: Extraocular movements intact.      Conjunctiva/sclera: Conjunctivae normal.      Pupils: Pupils are equal, round, and reactive to light.   Cardiovascular:      Rate and Rhythm: Normal rate and regular rhythm.      Heart sounds: Normal heart sounds. No murmur heard.  Pulmonary:      Effort: Pulmonary effort is normal. No respiratory distress.      Breath sounds: Normal breath sounds. No wheezing.   Musculoskeletal:      Cervical back: No rigidity.   Lymphadenopathy:      Cervical: No cervical adenopathy.   Skin:     General: Skin is warm and dry.      Findings: No rash.   Neurological:      General: No focal deficit present.      Mental Status: He is alert. Mental status is at baseline.   Psychiatric:         Mood and Affect: Mood normal.         Thought Content: Thought content normal.         Assessment & Plan  Generalized anxiety disorder    Orders:    LORazepam (Ativan) 1 mg tablet; Take one daily as needed for anxiety ,  may take a second one at least 6 hours later in the day if needed up to 5 times in a month Do not fill before March 13, 2025.    Long hx of severe JANETTE -   Sx controlled now - but feels \"too numb\"  -   Going to try a little less Citalopram first -   If no better or anxiety gets worse -   Then will try 1/2 of Abilify     Keeps appt every 3 months     Arthralgia of multiple joints    Seeing pain management     Cervical spondylosis with myelopathy         Current smoker    Once again, encouraged cutting back more   Benign essential hypertension    Controlled   Psoriasis    He would love improvement - but cannot have any form or steroids,  and is very worried about the risk with immunosuppressants  -   Discussed tanning   (But, also has hx of SCC)   Strongly encouraged getting back into derm      Keeps his appts every 3 mos       And having " LUTS - did not want med yet       CRC screen - declined at this time -   I discussed colonoscopy again and cologuard - he is to talk to his insurance     We discussed at visit any disease processes that were of concern as well as the risks, benefits and instructions of any new medication provided.    See orders and discussion section for information handed to patient on their Clinical Summary.   Patient (and/or caretaker of patient if present)  stated all questions were answered, and they voiced understanding of instructions.

## 2025-03-31 ENCOUNTER — OFFICE VISIT (OUTPATIENT)
Dept: PAIN MEDICINE | Facility: CLINIC | Age: 50
End: 2025-03-31
Payer: COMMERCIAL

## 2025-03-31 VITALS — HEART RATE: 81 BPM | DIASTOLIC BLOOD PRESSURE: 86 MMHG | RESPIRATION RATE: 20 BRPM | SYSTOLIC BLOOD PRESSURE: 121 MMHG

## 2025-03-31 DIAGNOSIS — M47.12 CERVICAL SPONDYLOSIS WITH MYELOPATHY: ICD-10-CM

## 2025-03-31 DIAGNOSIS — M54.16 LUMBAR RADICULOPATHY, RIGHT: ICD-10-CM

## 2025-03-31 DIAGNOSIS — M51.16 LUMBAR DISC DISEASE WITH RADICULOPATHY: ICD-10-CM

## 2025-03-31 DIAGNOSIS — M96.1 LUMBAR POSTLAMINECTOMY SYNDROME: ICD-10-CM

## 2025-03-31 PROCEDURE — 3079F DIAST BP 80-89 MM HG: CPT | Performed by: ANESTHESIOLOGY

## 2025-03-31 PROCEDURE — G2211 COMPLEX E/M VISIT ADD ON: HCPCS | Performed by: ANESTHESIOLOGY

## 2025-03-31 PROCEDURE — 3074F SYST BP LT 130 MM HG: CPT | Performed by: ANESTHESIOLOGY

## 2025-03-31 PROCEDURE — 99213 OFFICE O/P EST LOW 20 MIN: CPT | Performed by: ANESTHESIOLOGY

## 2025-03-31 RX ORDER — OXYCODONE HYDROCHLORIDE 5 MG/1
5 TABLET ORAL 3 TIMES DAILY PRN
Qty: 84 TABLET | Refills: 0 | Status: SHIPPED | OUTPATIENT
Start: 2025-05-02 | End: 2025-05-30

## 2025-03-31 RX ORDER — OXYCODONE HYDROCHLORIDE 5 MG/1
5 TABLET ORAL 3 TIMES DAILY PRN
Qty: 84 TABLET | Refills: 0 | Status: SHIPPED | OUTPATIENT
Start: 2025-04-04 | End: 2025-05-02

## 2025-03-31 RX ORDER — OXYCODONE HYDROCHLORIDE 5 MG/1
5 TABLET ORAL 3 TIMES DAILY PRN
Qty: 84 TABLET | Refills: 0 | Status: SHIPPED | OUTPATIENT
Start: 2025-05-30 | End: 2025-06-27

## 2025-03-31 ASSESSMENT — PAIN SCALES - GENERAL: PAINLEVEL_OUTOF10: 5 - MODERATE PAIN

## 2025-03-31 ASSESSMENT — PAIN - FUNCTIONAL ASSESSMENT: PAIN_FUNCTIONAL_ASSESSMENT: 0-10

## 2025-03-31 NOTE — PROGRESS NOTES
Subjective   Patient ID: Abdulaziz Ortez is a 49 y.o. male with a past medical history of cervical and lumbar postlaminectomy syndrome, chronic opioid use      HPI:   Abdulaziz is established patient here for follow-up for medication refill.  He currently takes oxycodone 5 mg 3 times a day as prescribed.  Does not miss doses.  Typically endorses 50% pain relief, but has had 1 month of acute exacerbation of pain.  Denies inciting event, although works full-time at a [employer] shop and often lifts heavy things.  Has been working through the pain.  Had his lumbar surgery in December 2022.  Has previously failed management with Topamax, Neurontin, Lyrica, Flexeril.  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    0-10 (Numeric) Pain Score: 5 - Moderate pain (8 since flareup last month)  Pain Type: Chronic pain  Pain Location: Back  Pain Orientation: Left  Pain Radiating Towards: lat thigh,post calf  Pain Frequency: Constant/continuous     PDMP reviewed, appropriate  Pain contract 10/24  UDS 7/24      Review of Systems   13-point ROS done and negative except for HPI.     Current Outpatient Medications   Medication Instructions    ARIPiprazole (ABILIFY) 5 mg, oral, Daily    citalopram (CeleXA) 20 mg tablet TAKE 1 & 1/2 TABLETS BY MOUTH EVERY DAY    hydroCHLOROthiazide (MICROZIDE) 12.5 mg, oral, Daily    LORazepam (Ativan) 1 mg tablet Take one daily as needed for anxiety ,  may take a second one at least 6 hours later in the day if needed up to 5 times in a month    losartan (COZAAR) 25 mg, oral, Daily    naloxone (NARCAN) 4 mg, nasal, As needed    oxyCODONE (ROXICODONE) 5 mg, oral, 3 times daily PRN    oxyCODONE (ROXICODONE) 5 mg, oral, 3 times daily PRN    oxyCODONE (ROXICODONE) 5 mg, oral, 3 times daily PRN    oxyCODONE (ROXICODONE) 5 mg, oral, 3 times daily PRN    oxyCODONE (ROXICODONE) 5 mg, oral, 3 times daily PRN       Past Medical  History:   Diagnosis Date    Acute gastritis without bleeding 02/06/2019    Acute gastritis without hemorrhage    Diverticulitis of intestine, part unspecified, without perforation or abscess without bleeding 03/09/2017    Acute diverticulitis    Personal history of other specified conditions 04/28/2020    History of headache        Past Surgical History:   Procedure Laterality Date    ANKLE SURGERY  11/20/2013    Ankle Surgery    MOUTH SURGERY  11/20/2013    Oral Surgery Tooth Extraction    OTHER SURGICAL HISTORY  03/23/2021    Appendectomy        Family History   Problem Relation Name Age of Onset    Genetic Disorder Mother          cardiomyopathy        Allergies   Allergen Reactions    Prednisone Other    Telithromycin Rash        Objective     Vitals:    03/31/25 0955   BP: 121/86   Pulse: 81   Resp: 20        Physical Exam  General: NAD, well groomed, well nourished  Eyes: Non-icteric sclera, EOMI  Ears, Nose, Mouth, and Throat: External ears and nose appear to be without deformity or rash. No lesions or masses noted. Hearing is grossly intact.   Neck: Trachea midline  Respiratory: Nonlabored breathing   Cardiovascular: no peripheral edema   Skin: No rashes or open lesions/ulcers identified on skin.    Back:   Palpation: tenderness to palpation over lumbar paraspinous muscles.   Straight leg raise: positive at 50 degrees on the right  and positive at 35 degrees on the left    Neurologic:   Cranial nerves grossly intact.   Strength 5/5 in right lower extremity in all planes of motion, 4 -/5 in hip flexion and knee extension.  5/5 in knee flexion and plantar/dorsiflexion  Sensation: Normal to light touch throughout, pinprick  intact throughout.    Psychiatric: Alert, orientation to person, place, and time. Cooperative.    Imaging personally reviewed and independently interpreted:   Lumbar MRI 2022  FINDINGS:  There are 5 lumbar type vertebral bodies with the last well-formed  disc space labeled L5-S1.      Alignment: There is straightening of the normal lumbar lordosis with  grade 1 retrolisthesis of L2 on L3, L3 on L4, and L4 on L5 measuring  3-4 mm.     Vertebra/intervertebral discs: Vertebral body heights are maintained.  Multilevel fatty endplate degenerative signal changes are present.  There is associated STIR hyperintense signal involving the L3-S1  vertebral bodies relating to a component of marrow edema. A rounded  T1 and T2 hyperintense focus is seen within the L4 vertebral body,  likely relating to a small vertebral hemangioma. There is moderate to  severe intervertebral disc height loss at L4-5 with more mild to  moderate multilevel disc height loss throughout the remaining lumbar  spine.     Conus medullaris: The conus medullaris terminates at the L1 level.     T12-L1: No spinal canal or neural foraminal narrowing.     L1-2: Posterior disc osteophyte complex along with facet and  ligamentum flavum hypertrophy contribute to minimal narrowing of the  central spinal canal. There is mild-to-moderate right and no  significant left neural foraminal narrowing.     L2-3: There is mild central spinal canal narrowing secondary to a  posterior disc osteophyte complex along with facet and ligamentum  flavum hypertrophy. There is moderate left and mild right neural  foraminal narrowing.     L3-4: There is severe central spinal canal narrowing secondary to  components of a posterior disc osteophyte complex along with facet  and ligamentum flavum hypertrophy. There is moderate bilateral neural  foraminal narrowing, left greater than right.     L4-5: There is moderate to severe central spinal canal narrowing  secondary to components of a posterior disc osteophyte complex along  with facet and ligamentum flavum hypertrophy. There is moderate right  and mild-to-moderate left neural foraminal narrowing.     L5-S1: There is a central disc protrusion superimposed upon a  posterior disc osteophyte complex with associated T2  hyperintense  signal seen centrally within the disc relating to an annular fissure.  There is no significant central spinal canal narrowing. There is  bilateral facet hypertrophy contributing to severe right and moderate  to severe left neural foraminal narrowing.     Paravertebral and posterior paraspinal soft tissues are unremarkable.     IMPRESSION:  Multilevel lumbar spondylosis as detailed above, most prominent at  L3-4 where there is severe central spinal canal and moderate  bilateral neural foraminal narrowing.    Assessment/Plan   Abdulaziz is a 49-year-old male with clinical picture consistent with worsening lumbar postlaminectomy syndrome.  He had acute worsening of pain 1 month ago, unsure of the event.  Has been associated new numbness and weakness in his left lower extremity from before.  Will get a updated lumbar MRI given new symptoms.  For his acute worsening of pain, will prescribe suzetrizine 50mg once a day while waiting for the MRI.  Will also refill his oxycodone.  He has previously failed management with Topamax, gabapentin, Lyrica, and Flexeril.  Also had significant history of steroid into psychosis, limiting injections.  He would be a great candidate for spinal cord stimulation.  Will provide information about SCS, he would like to think about it before proceeding.  Will follow-up after lumbar MRI.    Plan:  -Refill oxycodone 5 mg 3 times a day.  84 pills for 28 days  -Suzetrigine 50mg once a day for 30 days  -Lumbar MRI without contrast  -Will provide information about SCS.  Consider psych referral at next visit.    The patient has failed treatment with : Physical therapy , three or more classes of medications, injections, have significant limitations in their sleep quality due to the pain, have significant limitations of their quality of life due to the pain, have significant impairments of their activities of daily living (ADLs) due to the pain, and have significant impairments of their  instrumental activities of daily living (IADLs) due to the pain    We discussed  the risks, benefits and alternatives of the procedure including but not limited to: , Epidural hematoma, Post-dural puncture headache, Lack of efficacy , Transiently worsening pain , Bleeding, Infection , and Nerve Damage    Follow up: In 3 months    The patient was invited to contact us back anytime with any questions or concerns and follow-up with us in the office as needed.     Diagnoses and all orders for this visit:  Lumbar disc disease with radiculopathy  Lumbar radiculopathy, right  Lumbar postlaminectomy syndrome  Cervical spondylosis with myelopathy      This note was generated with the aid of dictation software, there may be typos despite my attempts at proofreading.    Ishan Jones, DO  Pain Fellow

## 2025-04-16 ENCOUNTER — TELEPHONE (OUTPATIENT)
Dept: PAIN MEDICINE | Facility: CLINIC | Age: 50
End: 2025-04-16
Payer: COMMERCIAL

## 2025-04-16 NOTE — TELEPHONE ENCOUNTER
"Dr. Gale,   Patient reports side effects with Suzetrigine.  He took it for 5 days and each day it was worse, feeling extremely high \"like on weed\" and nausea.  He spoke with a pharmacist and has stopped taking it.   He is taking tylenol 3,000 mg per day along with oxycodone 5 mg 3 times daily.    He states he is barely able to get through his work day and is asking if there is something else he can do for his pain.    Thanks.   "

## 2025-04-17 DIAGNOSIS — M47.816 LUMBAR FACET ARTHROPATHY: ICD-10-CM

## 2025-04-17 DIAGNOSIS — M96.1 LUMBAR POSTLAMINECTOMY SYNDROME: ICD-10-CM

## 2025-04-17 DIAGNOSIS — M48.061 SPINAL STENOSIS OF LUMBAR REGION WITHOUT NEUROGENIC CLAUDICATION: ICD-10-CM

## 2025-04-17 DIAGNOSIS — M54.16 LUMBAR RADICULOPATHY: ICD-10-CM

## 2025-04-17 DIAGNOSIS — M51.26 LUMBAR HERNIATED DISC: ICD-10-CM

## 2025-05-14 ENCOUNTER — HOSPITAL ENCOUNTER (OUTPATIENT)
Dept: RADIOLOGY | Facility: HOSPITAL | Age: 50
Discharge: HOME | End: 2025-05-14
Payer: COMMERCIAL

## 2025-05-14 DIAGNOSIS — M96.1 LUMBAR POSTLAMINECTOMY SYNDROME: ICD-10-CM

## 2025-05-14 DIAGNOSIS — M48.061 SPINAL STENOSIS OF LUMBAR REGION WITHOUT NEUROGENIC CLAUDICATION: ICD-10-CM

## 2025-05-14 DIAGNOSIS — M47.816 LUMBAR FACET ARTHROPATHY: ICD-10-CM

## 2025-05-14 DIAGNOSIS — M51.26 LUMBAR HERNIATED DISC: ICD-10-CM

## 2025-05-14 DIAGNOSIS — M54.16 LUMBAR RADICULOPATHY: ICD-10-CM

## 2025-05-14 PROCEDURE — 72148 MRI LUMBAR SPINE W/O DYE: CPT

## 2025-05-27 ENCOUNTER — OFFICE VISIT (OUTPATIENT)
Dept: PAIN MEDICINE | Facility: CLINIC | Age: 50
End: 2025-05-27
Payer: COMMERCIAL

## 2025-05-27 VITALS — DIASTOLIC BLOOD PRESSURE: 83 MMHG | SYSTOLIC BLOOD PRESSURE: 132 MMHG | HEART RATE: 94 BPM | RESPIRATION RATE: 20 BRPM

## 2025-05-27 DIAGNOSIS — M96.1 POSTLAMINECTOMY SYNDROME OF LUMBAR REGION: Primary | ICD-10-CM

## 2025-05-27 PROCEDURE — 3075F SYST BP GE 130 - 139MM HG: CPT | Performed by: ANESTHESIOLOGY

## 2025-05-27 PROCEDURE — 3079F DIAST BP 80-89 MM HG: CPT | Performed by: ANESTHESIOLOGY

## 2025-05-27 PROCEDURE — 99214 OFFICE O/P EST MOD 30 MIN: CPT | Performed by: ANESTHESIOLOGY

## 2025-05-27 ASSESSMENT — PAIN SCALES - GENERAL: PAINLEVEL_OUTOF10: 8

## 2025-05-27 ASSESSMENT — PAIN - FUNCTIONAL ASSESSMENT: PAIN_FUNCTIONAL_ASSESSMENT: 0-10

## 2025-05-27 ASSESSMENT — PAIN DESCRIPTION - DESCRIPTORS: DESCRIPTORS: RADIATING

## 2025-05-27 NOTE — PROGRESS NOTES
Subjective   Patient ID: Abdulaziz Ortez is a 50 y.o. male with a past medical history of cervical and lumbar postlaminectomy syndrome, chronic opioid use      HPI:   Patient here for follow-up for low back and leg pain.  Patient is here mainly for medication refill.  Patient currently takes oxycodone 5 mg 3 times a day as prescribed.  Does not use any doses.  Typically endorses anywhere from 30 to 50% relief of pain.  Patient reports that he had good response to suzetrigine however cannot tolerate the side effects so he stopped taking after few days.  Discussed MRI lumbar spine results with the patient.  Discussed spinal cord stimulation as a possible invention however patient would like to think about it more before proceeding.  Patient still continues to do home exercise program as tolerated.    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    0-10 (Numeric) Pain Score: 8  Pain Type: Chronic pain  Pain Location: Back  Pain Orientation: Left  Pain Descriptors: Radiating  Pain Frequency: Constant/continuous     PDMP reviewed, appropriate  Pain contract 10/24  UDS 7/24      Review of Systems   13-point ROS done and negative except for HPI.     Current Outpatient Medications   Medication Instructions    ARIPiprazole (ABILIFY) 5 mg, oral, Daily    citalopram (CeleXA) 20 mg tablet TAKE 1 & 1/2 TABLETS BY MOUTH EVERY DAY    hydroCHLOROthiazide (MICROZIDE) 12.5 mg, oral, Daily    LORazepam (Ativan) 1 mg tablet Take one daily as needed for anxiety ,  may take a second one at least 6 hours later in the day if needed up to 5 times in a month    losartan (COZAAR) 25 mg, oral, Daily    naloxone (NARCAN) 4 mg, nasal, As needed    oxyCODONE (ROXICODONE) 5 mg, oral, 3 times daily PRN    oxyCODONE (ROXICODONE) 5 mg, oral, 3 times daily PRN    [START ON 5/30/2025] oxyCODONE (ROXICODONE) 5 mg, oral, 3 times daily PRN    oxyCODONE (ROXICODONE) 5  mg, oral, 3 times daily PRN    oxyCODONE (ROXICODONE) 5 mg, oral, 3 times daily PRN       Past Medical History:   Diagnosis Date    Acute gastritis without bleeding 02/06/2019    Acute gastritis without hemorrhage    Diverticulitis of intestine, part unspecified, without perforation or abscess without bleeding 03/09/2017    Acute diverticulitis    Personal history of other specified conditions 04/28/2020    History of headache        Past Surgical History:   Procedure Laterality Date    ANKLE SURGERY  11/20/2013    Ankle Surgery    MOUTH SURGERY  11/20/2013    Oral Surgery Tooth Extraction    OTHER SURGICAL HISTORY  03/23/2021    Appendectomy        Family History   Problem Relation Name Age of Onset    Genetic Disorder Mother          cardiomyopathy        Allergies   Allergen Reactions    Prednisone Other    Telithromycin Rash        Objective     Vitals:    05/27/25 0847   BP: 132/83   Pulse: 94   Resp: 20        Physical Exam  General: NAD, well groomed, well nourished  Eyes: Non-icteric sclera, EOMI  Ears, Nose, Mouth, and Throat: External ears and nose appear to be without deformity or rash. No lesions or masses noted. Hearing is grossly intact.   Neck: Trachea midline  Respiratory: Nonlabored breathing   Cardiovascular: no peripheral edema   Skin: No rashes or open lesions/ulcers identified on skin.    Back:   Palpation: tenderness to palpation over lumbar paraspinous muscles.   Straight leg raise: positive at 50 degrees on the right  and positive at 35 degrees on the left    Neurologic:   Cranial nerves grossly intact.   Strength 5/5 in right lower extremity in all planes of motion, 4 -/5 in hip flexion and knee extension.  5/5 in knee flexion and plantar/dorsiflexion  Sensation: Normal to light touch throughout, pinprick  intact throughout.    Psychiatric: Alert, orientation to person, place, and time. Cooperative.    Imaging personally reviewed and independently interpreted:   Lumbar MRI  2022  FINDINGS:  There are 5 lumbar type vertebral bodies with the last well-formed  disc space labeled L5-S1.     Alignment: There is straightening of the normal lumbar lordosis with  grade 1 retrolisthesis of L2 on L3, L3 on L4, and L4 on L5 measuring  3-4 mm.     Vertebra/intervertebral discs: Vertebral body heights are maintained.  Multilevel fatty endplate degenerative signal changes are present.  There is associated STIR hyperintense signal involving the L3-S1  vertebral bodies relating to a component of marrow edema. A rounded  T1 and T2 hyperintense focus is seen within the L4 vertebral body,  likely relating to a small vertebral hemangioma. There is moderate to  severe intervertebral disc height loss at L4-5 with more mild to  moderate multilevel disc height loss throughout the remaining lumbar  spine.     Conus medullaris: The conus medullaris terminates at the L1 level.     T12-L1: No spinal canal or neural foraminal narrowing.     L1-2: Posterior disc osteophyte complex along with facet and  ligamentum flavum hypertrophy contribute to minimal narrowing of the  central spinal canal. There is mild-to-moderate right and no  significant left neural foraminal narrowing.     L2-3: There is mild central spinal canal narrowing secondary to a  posterior disc osteophyte complex along with facet and ligamentum  flavum hypertrophy. There is moderate left and mild right neural  foraminal narrowing.     L3-4: There is severe central spinal canal narrowing secondary to  components of a posterior disc osteophyte complex along with facet  and ligamentum flavum hypertrophy. There is moderate bilateral neural  foraminal narrowing, left greater than right.     L4-5: There is moderate to severe central spinal canal narrowing  secondary to components of a posterior disc osteophyte complex along  with facet and ligamentum flavum hypertrophy. There is moderate right  and mild-to-moderate left neural foraminal narrowing.      L5-S1: There is a central disc protrusion superimposed upon a  posterior disc osteophyte complex with associated T2 hyperintense  signal seen centrally within the disc relating to an annular fissure.  There is no significant central spinal canal narrowing. There is  bilateral facet hypertrophy contributing to severe right and moderate  to severe left neural foraminal narrowing.     Paravertebral and posterior paraspinal soft tissues are unremarkable.     IMPRESSION:  Multilevel lumbar spondylosis as detailed above, most prominent at  L3-4 where there is severe central spinal canal and moderate  bilateral neural foraminal narrowing.    Assessment/Plan   Abdulaziz is a 49-year-old male with clinical picture consistent with worsening lumbar postlaminectomy syndrome.  Patient had good relief of pain with the suzetrigine however cannot tolerate side effects so he stopped taking after several days of taking it.  Patient is still taking oxycodone 5 mg 3 times a day as prescribed.  Denies any side effects from medication.  Discussed MRI lumbar spine results with patient.  Also discussed spinal cord stimulation as possible intervention however patient would like more time to think about it before proceeding.  He has previously failed management with Topamax, gabapentin, Lyrica, and Flexeril.  Also had significant history of steroid into psychosis, limiting injections.  He would be a great candidate for spinal cord stimulation.  Will provide information about SCS, he would like to think about it before proceeding.  Will follow-up after lumbar MRI.    I have personally reviewed the [facility] report for Abdulaziz Ortez I have considered the risks of abuse, dependence, addiction and diversion.  Morphine milligram equivalents is 23.3/day      Plan:  -Refill oxycodone 5 mg 3 times a day.  84 pills for 28 days  -Discussed cutting suzetrigine tablet in half to tolerate side effects more   -Will provide information about SCS.  Consider  psych referral at next visit.    The patient has failed treatment with : Physical therapy , three or more classes of medications, injections, have significant limitations in their sleep quality due to the pain, have significant limitations of their quality of life due to the pain, have significant impairments of their activities of daily living (ADLs) due to the pain, and have significant impairments of their instrumental activities of daily living (IADLs) due to the pain    We discussed  the risks, benefits and alternatives of the procedure including but not limited to: , Epidural hematoma, Post-dural puncture headache, Lack of efficacy , Transiently worsening pain , Bleeding, Infection , and Nerve Damage    Follow up: In 3 months    The patient was invited to contact us back anytime with any questions or concerns and follow-up with us in the office as needed.     Diagnoses and all orders for this visit:  Postlaminectomy syndrome of lumbar region        This note was generated with the aid of dictation software, there may be typos despite my attempts at proofreading.    Nicola Gale MD  Pain Fellow

## 2025-05-30 ENCOUNTER — APPOINTMENT (OUTPATIENT)
Dept: PRIMARY CARE | Facility: CLINIC | Age: 50
End: 2025-05-30
Payer: COMMERCIAL

## 2025-05-30 VITALS
HEART RATE: 76 BPM | HEIGHT: 72 IN | OXYGEN SATURATION: 95 % | SYSTOLIC BLOOD PRESSURE: 132 MMHG | DIASTOLIC BLOOD PRESSURE: 80 MMHG | BODY MASS INDEX: 30.88 KG/M2 | WEIGHT: 228 LBS

## 2025-05-30 DIAGNOSIS — I10 BENIGN ESSENTIAL HYPERTENSION: ICD-10-CM

## 2025-05-30 DIAGNOSIS — F41.1 GENERALIZED ANXIETY DISORDER: ICD-10-CM

## 2025-05-30 DIAGNOSIS — L40.9 PSORIASIS: ICD-10-CM

## 2025-05-30 DIAGNOSIS — Z12.11 SCREEN FOR COLON CANCER: Primary | ICD-10-CM

## 2025-05-30 DIAGNOSIS — R60.0 PERIPHERAL EDEMA: ICD-10-CM

## 2025-05-30 DIAGNOSIS — Q07.00 ARNOLD-CHIARI SYNDROME WITHOUT SPINA BIFIDA OR HYDROCEPHALUS (MULTI): ICD-10-CM

## 2025-05-30 DIAGNOSIS — M25.50 ARTHRALGIA OF MULTIPLE JOINTS: ICD-10-CM

## 2025-05-30 DIAGNOSIS — F17.200 CURRENT SMOKER: ICD-10-CM

## 2025-05-30 PROCEDURE — 3008F BODY MASS INDEX DOCD: CPT | Performed by: FAMILY MEDICINE

## 2025-05-30 PROCEDURE — 3079F DIAST BP 80-89 MM HG: CPT | Performed by: FAMILY MEDICINE

## 2025-05-30 PROCEDURE — 3075F SYST BP GE 130 - 139MM HG: CPT | Performed by: FAMILY MEDICINE

## 2025-05-30 PROCEDURE — 99214 OFFICE O/P EST MOD 30 MIN: CPT | Performed by: FAMILY MEDICINE

## 2025-05-30 RX ORDER — CITALOPRAM 20 MG/1
20 TABLET ORAL DAILY
Status: SHIPPED
Start: 2025-05-30

## 2025-05-30 RX ORDER — LORAZEPAM 1 MG/1
TABLET ORAL
Qty: 30 TABLET | Refills: 2 | Status: SHIPPED | OUTPATIENT
Start: 2025-06-06 | End: 2025-07-26

## 2025-05-30 RX ORDER — ARIPIPRAZOLE 2 MG/1
2 TABLET ORAL DAILY
Qty: 90 TABLET | Refills: 3 | Status: SHIPPED | OUTPATIENT
Start: 2025-05-30

## 2025-05-30 ASSESSMENT — PATIENT HEALTH QUESTIONNAIRE - PHQ9
1. LITTLE INTEREST OR PLEASURE IN DOING THINGS: NOT AT ALL
2. FEELING DOWN, DEPRESSED OR HOPELESS: NOT AT ALL
SUM OF ALL RESPONSES TO PHQ9 QUESTIONS 1 AND 2: 0

## 2025-05-30 NOTE — PROGRESS NOTES
"Subjective   Patient ID: Abdulaziz Ortez is a 50 y.o. male who presents for Follow-up.    HPI     LOV 2/2025  -        At that appt - was going to try less citalopram for feeling \"too numb\"       Updates and Concerns:     He is down to the citalopram 20 mg daily and Abilify  2.5 mg daily    Mood is much - not \"too numb\"   Anxiety stayed under control       Still going to pain management -   Tried Journavx (suzetrigine) - when he took it felt \"high\"   Supposed to try 1/2 dose  - has not yet   Worked great for pain control     Still on oxycodone TID         Chronic issues reviewed today:            HTN -   Losartan 25 mg a day ,   Hydrochlorothiazide 12.5 mg a day   (Propranolol dropped pulse too low)     Will get labs in [facility]       Swelling  in legs often   Echo Aug 2023 - WNL       Lab 2/2024 - BMP fine   Declines stockings   Lasix did not help   Its been better lately - moving more        Severe Generalized Anxiety  -   Has been on Lorazepam for many years -   I have weaned him down to the lowest dose I can that still helps his anxiety   - and without it, gets much worse.     Last RX filled #35  2 R   on   12/13/2024   States #30 would be fine     HE STATES HE GOES INTO A PANIC ATTACK IF HE SEES HE IS CLOSE TO RUNNING OUT   I have tried to wean down several times - gets MUCH worse when I do -       Also on Citalopram 20  mg a day and   Aripiprazole 2.5  mg a day -   has been on several meds for anxiety in the past - these meds have helped him the most    He is feeling a little too numb - feels like he does not care about anything     Has tried propranolol and hydroxyzine - does not do well with either      I have personally reviewed the [place]AR[place] report for this person. I find it to be appropriate.   I have considered the risk of abuse, dependence, addiction and diversion.  I believe that it is clinically appropriate for this person to be prescribed this medication for the documented diagnoses.   OARRS report was " reviewed on any day a prescription is written for a controlled substance.        Overdose risk score of  510    Contract - updated 11/25/24  UDS  - done with pain management (will not perform again due to cost)  - last one 7/18/24      JANETTE - 7  -    Anxiety at a zero today      We have discussed the great risk of narcotics with benzos  - he spaces them out.          Chronic severe back and neck pain:     herniated disc and DDD of spine -     DR Riley performed partial C3 laminectomy and C4 - 6 laminoplasty on 12/11/2020 12/16/22 - Lumbar L3- decompressive laminectomy     CANNOT HAVE STEROIDS - TERRIBLE REACTION IN THE PAST -psychiatric     (Cannot even use topical - OTC cortisone even effects him)      trying to eat a low amount of sugar and gluten              Pain Management - seeing DR Gale  Did see DR Knight ([facility]) - she does not feel he has psoriatic arthritis   (Does have psoriasis)     Pain meds - Oxycodone 5 mg TID with 2 Tylenol       No NSAIDS      smoking - down to  5   - 6   cig a day  -     About the same      Declines vaccines despite my continued encouragement     BPH -   Polyuria   Does have dribbling at times  No dysuria   Nocturia 1 -2  times hs  Does not want medication now   PSA  8/2023 -less than one     Psoriasis  - allergic to steroids - even cream   Did see DR Knight in the past     Hx of SCC scalp 2021 - saw DR Banda            CRC -  no colonoscopy yet  No colon cancer in the family       Will take order for cologuard     Review of Systems    Objective   /80   Pulse 76   Ht 1.829 m (6')   Wt 103 kg (228 lb)   SpO2 95%   BMI 30.92 kg/m²     Physical Exam  Vitals reviewed.   Constitutional:       General: He is not in acute distress.     Appearance: Normal appearance.   HENT:      Head: Normocephalic and atraumatic.      Nose: Nose normal.      Mouth/Throat:      Mouth: Mucous membranes are moist.      Pharynx: No posterior oropharyngeal erythema.   Eyes:      Extraocular  Movements: Extraocular movements intact.      Conjunctiva/sclera: Conjunctivae normal.      Pupils: Pupils are equal, round, and reactive to light.   Cardiovascular:      Rate and Rhythm: Normal rate and regular rhythm.      Heart sounds: Normal heart sounds. No murmur heard.  Pulmonary:      Effort: Pulmonary effort is normal. No respiratory distress.      Breath sounds: Normal breath sounds. No wheezing.   Musculoskeletal:      Cervical back: No rigidity.   Lymphadenopathy:      Cervical: No cervical adenopathy.   Skin:     General: Skin is warm and dry.      Findings: Rash (psoriasis elbows) present.   Neurological:      General: No focal deficit present.      Mental Status: He is alert. Mental status is at baseline.   Psychiatric:         Mood and Affect: Mood normal.         Thought Content: Thought content normal.         Assessment & Plan  Generalized anxiety disorder    Orders:    citalopram (CeleXA) 20 mg tablet; Take 1 tablet (20 mg) by mouth once daily. TAKE 1 & 1/2 TABLETS BY MOUTH EVERY DAY    ARIPiprazole (Abilify) 2 mg tablet; Take 1 tablet (2 mg) by mouth once daily.    LORazepam (Ativan) 1 mg tablet; Take one daily as needed for anxiety ,  may take a second one at least 6 hours later in the day if needed up to 5 times in a month Do not fill before June 6, 2025.      Mood doing very well -   I am cutting his lorazepam down to #30 a month at a time -     Screen for colon cancer    Orders:    Cologuard® colon cancer screening; Future    Current smoker    He is smoking as little as he can     Arthralgia of multiple joints    Sees pain management    Psoriasis    Cannot have any form of steroids   Too nervous to go on immunosuppressants   Benign essential hypertension    Well controlled  - no change in meds   Peripheral edema            Keeps his appts every 3 mos       And having LUTS - did not want med yet       CRC screen  - agreed to cologuard order     We discussed at visit any disease processes that  were of concern as well as the risks, benefits and instructions of any new medication provided.    See orders and discussion section for information handed to patient on their Clinical Summary.   Patient (and/or caretaker of patient if present)  stated all questions were answered, and they voiced understanding of instructions.

## 2025-05-30 NOTE — PATIENT INSTRUCTIONS
[facility] hear [facility] within a week  - if not  -   Give them a call  [phone]       You have been given  prescription(s) for a controlled medication.   This/these medications  can be dangerous if not taken exactly as directed.    You have filled out a controlled medication contract, and it is very important to abide by the contract in order for me to continue to prescribe these medications for you.  You must take these medications as directed, you cannot let anyone else take any of these medications.  You have to keep them in a safe place to protect other people or animals from getting into them.     I cannot refill them if you request a refill earlier than expected.   You cannot get refills for these medications on weekends or after office hours.   You must make sure you have a visit with me every 3 months in order for me to continue to prescribe this/these medications.   Its important you are sure to ask me if you have any questions about our policies on these medications or the medications themselves.    Please bring your bottles of any remaining medications with you to any appointment where you will be needing refills of these medications.     You can be called at any time to come in for a drug screen and/or a pill count if we feel it is necessary.   If we do call you for this, you would need to come into the office within 24 hours of our call.

## 2025-05-30 NOTE — ASSESSMENT & PLAN NOTE
Orders:    citalopram (CeleXA) 20 mg tablet; Take 1 tablet (20 mg) by mouth once daily. TAKE 1 & 1/2 TABLETS BY MOUTH EVERY DAY    ARIPiprazole (Abilify) 2 mg tablet; Take 1 tablet (2 mg) by mouth once daily.    LORazepam (Ativan) 1 mg tablet; Take one daily as needed for anxiety ,  may take a second one at least 6 hours later in the day if needed up to 5 times in a month Do not fill before June 6, 2025.      Mood doing very well -   I am cutting his lorazepam down to #30 a month at a time -

## 2025-06-05 DIAGNOSIS — I10 BENIGN ESSENTIAL HYPERTENSION: ICD-10-CM

## 2025-06-05 RX ORDER — LOSARTAN POTASSIUM 25 MG/1
25 TABLET ORAL DAILY
Qty: 90 TABLET | Refills: 0 | Status: SHIPPED | OUTPATIENT
Start: 2025-06-05 | End: 2025-06-06

## 2025-06-05 RX ORDER — HYDROCHLOROTHIAZIDE 12.5 MG/1
12.5 TABLET ORAL DAILY
Qty: 90 TABLET | Refills: 0 | Status: SHIPPED | OUTPATIENT
Start: 2025-06-05 | End: 2025-06-06

## 2025-06-06 RX ORDER — LOSARTAN POTASSIUM 25 MG/1
25 TABLET ORAL DAILY
Qty: 90 TABLET | Refills: 0 | Status: SHIPPED | OUTPATIENT
Start: 2025-06-06

## 2025-06-06 RX ORDER — HYDROCHLOROTHIAZIDE 12.5 MG/1
12.5 TABLET ORAL DAILY
Qty: 90 TABLET | Refills: 0 | Status: SHIPPED | OUTPATIENT
Start: 2025-06-06

## 2025-06-14 LAB — NONINV COLON CA DNA+OCC BLD SCRN STL QL: NORMAL

## 2025-06-24 ENCOUNTER — OFFICE VISIT (OUTPATIENT)
Dept: PAIN MEDICINE | Facility: CLINIC | Age: 50
End: 2025-06-24
Payer: COMMERCIAL

## 2025-06-24 ENCOUNTER — APPOINTMENT (OUTPATIENT)
Dept: PAIN MEDICINE | Facility: CLINIC | Age: 50
End: 2025-06-24
Payer: COMMERCIAL

## 2025-06-24 VITALS
RESPIRATION RATE: 18 BRPM | OXYGEN SATURATION: 94 % | HEART RATE: 88 BPM | DIASTOLIC BLOOD PRESSURE: 88 MMHG | SYSTOLIC BLOOD PRESSURE: 137 MMHG

## 2025-06-24 DIAGNOSIS — M96.1 LUMBAR POSTLAMINECTOMY SYNDROME: ICD-10-CM

## 2025-06-24 DIAGNOSIS — M51.16 LUMBAR DISC DISEASE WITH RADICULOPATHY: ICD-10-CM

## 2025-06-24 DIAGNOSIS — Z79.899 ENCOUNTER FOR LONG-TERM (CURRENT) USE OF HIGH-RISK MEDICATION: Primary | ICD-10-CM

## 2025-06-24 DIAGNOSIS — M47.12 CERVICAL SPONDYLOSIS WITH MYELOPATHY: ICD-10-CM

## 2025-06-24 DIAGNOSIS — M54.16 LUMBAR RADICULOPATHY, RIGHT: ICD-10-CM

## 2025-06-24 PROCEDURE — 99214 OFFICE O/P EST MOD 30 MIN: CPT | Performed by: NURSE PRACTITIONER

## 2025-06-24 PROCEDURE — 3075F SYST BP GE 130 - 139MM HG: CPT | Performed by: NURSE PRACTITIONER

## 2025-06-24 PROCEDURE — 3079F DIAST BP 80-89 MM HG: CPT | Performed by: NURSE PRACTITIONER

## 2025-06-24 RX ORDER — OXYCODONE HYDROCHLORIDE 5 MG/1
5 TABLET ORAL 3 TIMES DAILY PRN
Qty: 84 TABLET | Refills: 0 | Status: SHIPPED | OUTPATIENT
Start: 2025-06-27 | End: 2025-07-25

## 2025-06-24 RX ORDER — OXYCODONE HYDROCHLORIDE 5 MG/1
5 TABLET ORAL 3 TIMES DAILY PRN
Qty: 84 TABLET | Refills: 0 | Status: SHIPPED | OUTPATIENT
Start: 2025-07-25 | End: 2025-08-22

## 2025-06-24 RX ORDER — OXYCODONE HYDROCHLORIDE 5 MG/1
5 TABLET ORAL 3 TIMES DAILY PRN
Qty: 84 TABLET | Refills: 0 | Status: SHIPPED | OUTPATIENT
Start: 2025-08-22 | End: 2025-09-19

## 2025-06-24 ASSESSMENT — PAIN - FUNCTIONAL ASSESSMENT: PAIN_FUNCTIONAL_ASSESSMENT: 0-10

## 2025-06-24 ASSESSMENT — PAIN DESCRIPTION - DESCRIPTORS: DESCRIPTORS: ACHING

## 2025-06-24 ASSESSMENT — ENCOUNTER SYMPTOMS
OCCASIONAL FEELINGS OF UNSTEADINESS: 0
DEPRESSION: 0
LOSS OF SENSATION IN FEET: 0

## 2025-06-24 NOTE — PROGRESS NOTES
"Abdulaziz follows up for interval reevaluation of chronic pain. Endorses chronic low back pain with radiculopathy and chronic neck pain.  PMH includes lumbar post laminectomy syndrome, cervical radiculopathy.  Neck pain currently is well managed and is at times sore if he \"over does it.\"  LBP extends to left lateral leg to the knee with that area being numb continuously.  Denies fever, trauma, bowel/bladder incontinence.  Oxycodone 5 mg TID prn is somewhat effective. Suzetrigine was added but he could not tolerate it. Suggested he take half but he has not tried this yet.  He has previously failed management with Topamax, gabapentin, Lyrica, and Flexeril. Also had significant history of steroid into psychosis, limiting injections.   SCS was suggested. He is still not sure about this.  Would like to continue medication regime for now  He uses heat and tylenol prn too    OARRS:  Shikha Carrera, APRN-CNP on 6/24/2025  8:03 AM  I have personally reviewed the OARRS report for Abdulaziz Ortez. I have considered the risks of abuse, dependence, addiction and diversion    Is the patient prescribed a combination of a benzodiazepine and opioid?  Yes, I feel it is clincially indicated to continue the medication and have discussed with the patient risks/benefits/alternatives.    Last Urine Drug Screen / ordered today: Yes Updated today  No results found for this or any previous visit (from the past 8760 hours).  Results are as expected.     Controlled Substance Agreement:  Date of the Last Agreement: 10/2024  Reviewed Controlled Substance Agreement including but not limited to the benefits, risks, and alternatives to treatment with a Controlled Substance medication(s).    ROS otherwise negative aside from what was mentioned above in HPI.      Problem List[1]  Medical History[2]  Surgical History[3]  Family History[4]  Social History[5]      ALLERGIES  Allergies[6]      MEDICATIONS  Current Medications[7]        For continuity:   Given " the patient's report of reduced pain and improved functional ability without adverse effects, it is reasonable to treat with narcotic medications. The terms of the opioid agreement as well as the potential risks and adverse effects of the patient's medication regimen were discussed in detail. This includes if applicable due to dosage of medication permission to discuss and coordinate care with other treatment providers relevant to the patients condition. The patient verbalized understanding.      Risks and side effects of chronic opioid therapy including but not limited to tolerance, dependence, constipation, hyperalgesia, cognitive side effects, addiction and possible death due to overuse and or misuse were discussed. I also discussed that such medications when co-administered with other sedative agents including but not limited to alcohol, benzodiazepines, sedative hypnotics and illegal drugs could pose life threatening consequences including death. I also explained the impact that the administration of such medication has on a patient with obstructive sleep apnea and continued recommendations for use of apnea devices if ordered are prescribed by other physicians. In order to effectively and safely treat the pain, I also emphasized the importance of compliance with the treatment plan, as well as compliance with the terms of the opioid agreement, which was reviewed in detail. I explained the importance of being responsible with the medications and to take these only as prescribed, never in excess and never for reasons other than pain reduction. The patient was counseled on keeping the medications safe and locked away from children and other adults as well as disposal methods and options. The patient understood the risks and instructions.      I also discussed with the patient in detail that based on the clinical response to the opioid medications and improvements of activities of daily living, sleep, and work  performance in light of compliance with the treatment plan we can continue this form of therapy for the above chronic pain. The goal and rationale used for current treatment with chronic opioid medication is to control the pain and alleviate disability induced by the chronic pain condition noted above after failures of other non-opioid and nonpharmacological modalities to treat the chronic pain and the symptoms associated with have failed. The patient understood the goals in terms of the above treatment plan and had no further questions prior to leaving the office today.      Of note, the above-mentioned diagnoses/conditions and expected fluctuating nature of pain, and pain characteristic changes may lead to prolonged functional impairment requiring frequent and multiple reassessments with continued high level medical decision making. As noted, medication and medication management may require opiate therapy in excess of a routine less than 30 day medication requirement. The patient may require daily opiate therapy necessitating month-long prescription medication as noted above in order to perform activities of daily living and achieve acceptable quality of life with respect to their chronic pain condition for the foreseeable future. We monitor our patient's carefully through drug monitoring, medication counts, urine drug testing specific to their medication as well as a myriad of other substances and with frequent follow-ups with interval reassement of the chronic pain condition, its pathophysiology and prognosis.      The level of clinical decision making at this office visit is high due to high risks and complications including mortality and morbidity related to acute and chronic pain with respects to life, bodily function, and treatment. Risks and clinical decisions with respect to under treatment, failure to maintain adequate treatment, and/or overtreatment complications and outcomes were discussed with the  patient with respect to their chronic pain conditions, interventional therapies, as well as the use of various medications including possible controlled/dangerous medications. The amount and complexity of reviewed data at this in subsequent office visits is high given patient's fluctuating clinical presentation, laboratory and radiographic reports, prescription monitoring program data, and medication history as well as other relevant data as noted above. Pertinent negatives and positives data was used in consideration for the above-mentioned high complexity.       Given the patient's total MED, general use of daily opiates, or other coadministered medications in various classes the patient was offered a prescription for Narcan. I instructed the patient that it is important that patient fill this medication in order to demonstrate understanding of the gravity of possible side effects including respiratory depression and risk of overdose of this opiate load or medication combination. As such patient will be required to bring Narcan prescription to follow-up appointments as part of compliance with continued opiate care.      With respect to opiate induced constipation I discussed multiple ways to combat this problem including staying hydrated and taking over-the-counter medications such as Dulcolax, Miralax and Senna. If these treatments are not effective we could consider such medications as Amitiza, Linzess and Movantik.      Physical Exam  Vitals and nursing note reviewed.   Constitutional:       Appearance: Normal appearance. No acte distress.  HENT:      Head: Normocephalic and atraumatic.   Eyes:      Conjunctiva/sclera: Conjunctivae normal.   Cardiovascular:      Pulses: Normal pulses.   Pulmonary:      Effort: Pulmonary effort is normal. No respiratory distress.   Musculoskeletal:      Right lower leg: No edema.      Left lower leg: No edema.      +TTP with pain lumbar paraspinal areas, bilateral hips  Skin:      General: Skin is warm and dry. Psoriatic plaques  Neurological:      Mental Status: Alert and oriented to person, place, and time.      Cranial Nerves: No cranial nerve deficit.      Motor: No weakness.      Gait: Gait normal.   Psychiatric:         Behavior: Behavior normal.     MRI of the lumbar spine without IV contrast;  5/14/2025 3:23 pm      INDICATION:  Signs/Symptoms:low back pain.      ,M51.26 Other intervertebral disc displacement, lumbar region,M96.1  Postlaminectomy syndrome, not elsewhere classified,M48.061 Spinal  stenosis, lumbar region without neurogenic claudication,M47.816  Spondylosis without myelopathy or radiculopathy, lumbar region,M54.16  Radiculopathy, lumbar region      COMPARISON:  Radiograph 04/27/2023 and MRI 09/08/2022      ACCESSION NUMBER(S):  [id number]      ORDERING CLINICIAN:  AISHWARYA ZHANG      TECHNIQUE:  Sagittal T1, sagittal T2, sagittal STIR, axial T1 and axial T2  weighted images through the lumbar spine.      FINDINGS:  For counting purposes the last lumbarized vertebral body is labeled  L5. There is dextroconvexity of the lower lumbar spine.      There is trace retrolisthesis of L2 on L3, L3 on L4, L4 on L5 and L5  on S1.      Vertebral body heights are maintained.      Edema within the endplates at L2-L3 and a lesser degree at T12-L1,  L1-L2 and L5-S1 likely represent Modic type 1 degenerative endplate  changes. There are Modic type 2 degenerative endplate changes at  L4-L5.      There is desiccated disc signal throughout the lower thoracic and  lumbar spine. Moderate disc height loss at L2-L3 through L5-S1.      The conus terminates at T12-L1. Prevertebral soft tissues are not  thickened.      Evaluation by level:      T12-L1: Mild disc bulge and facet arthrosis. No spinal canal or  neural foraminal stenosis.      L1-L2: Disc bulge and facet arthrosis. Mild spinal canal stenosis,  mild narrowing of the right subarticular recess and mild right neural  foraminal  stenosis. No left neural foraminal stenosis.      L2-L3: Disc bulge, facet arthrosis and ligamentum flavum thickening.  There is a superimposed left subarticular disc protrusion measuring  0.8 cm in craniocaudal dimension and 0.7 cm in AP dimension.  Mild-to-moderate spinal canal stenosis, narrowing of the left  subarticular recess and mild bilateral neural foraminal stenosis.      L3-L4: Status post laminectomy. Disc bulge and facet arthrosis. No  spinal canal stenosis, mild narrowing of the subarticular recess,  mild-to-moderate left and mild right neural foraminal stenosis.      L4-L5: Status post laminectomy. Disc bulge and facet arthrosis. No  spinal canal stenosis. Mild-to-moderate bilateral neural foraminal  stenosis.      L5-S1: Disc bulge with a superimposed small central disc protrusion  and facet arthrosis. No spinal canal stenosis. There is abutment  without displacement of the descending right S1 nerve root. Please  correlate clinically for symptoms of right S1 radiculopathy. Moderate  neural foraminal stenosis, right greater than left.      IMPRESSION:  Status post laminectomy at L3-L4 and L4-L5.      Degenerative changes of the lumbar spine most pronounced at L2-L3  with mild-to-moderate spinal canal stenosis, narrowing of the left  subarticular recess and mild bilateral neural foraminal stenosis.      Additional degenerative changes as detailed above.      I personally reviewed the images/study and I agree with the findings  as stated. This study was interpreted at [facility], [place], [place].      MACRO:  None      Signed by: Rashmi Munoz 5/16/2025 9:32 AM    MR CERVICAL SPINE WO IV CONTRAST;  11/9/2023 11:15 am      INDICATION:  Signs/Symptoms:NECK PAIN.      COMPARISON:  None.      ACCESSION NUMBER(S):  [id number]      ORDERING CLINICIAN:  AISHWARYA ZHANG      TECHNIQUE:  The cervical spine was studied in the sagittal and axial planes  utilizing T1 and T2  weighted images.      FINDINGS:  The craniovertebral junction is normal. There is hydromyelia within  the spinal cord from C2 through C4. The cord is normal in size. There  is downward displacement and deformity of the cerebellar tonsils  consistent with Chiari malformation.. The marrow signal is normal.  Serial axial images reveal the following: C2/C3  There is normal alignment and vertebral body height. The disc space  is normal. There is no evidence of canal or foraminal narrowing.  There is no evidence of bulging or herniated disc. C3/C4  There is normal alignment and vertebral body height. The disc space  is normal. There is no evidence of canal or foraminal narrowing.  There is no evidence of bulging or herniated disc. C4/C5  Previous right-sided laminectomy with internal fixation. Bilateral  facet hypertrophy. Asymmetrical uncovertebral joint hypertrophy  toward the right. Focal right-sided foraminal narrowing without  measurable canal stenosis. C5/C6  Previous right-sided laminectomy with internal fixation. Bilateral  facet hypertrophy without canal or foraminal narrowing C6/C7  Previous right-sided laminectomy with internal fixation. Bilateral  facet hypertrophy without canal or foraminal narrowing C7/T1  There is normal alignment and vertebral body height. The disc space  is normal. There is no evidence of canal or foraminal narrowing.  There is no evidence of bulging or herniated disc.      IMPRESSION:  * Cerebellar tonsillar ptosis with hydromyelia. Findings are  consistent with Chiari malformation *Postoperative changes as  described include right-sided laminectomies from C4 through C6 with  internal fixation. *Narrowing of intervertebral disc spaces without  measurable canal stenosis      THIS EXAMINATION WAS INTERPRETED AT [facility] [place]      Signed by: Grant Molina 11/9/2023 11:23 AM    Encounter Diagnoses   Name Primary?    Lumbar disc disease with radiculopathy     Lumbar radiculopathy, right     Lumbar  postlaminectomy syndrome     Cervical spondylosis with myelopathy     Encounter for long-term (current) use of high-risk medication Yes       Plan  Continue plan of care  Oxycodone as currently prescribed  UDS updated today  CSA is up to date  Counseled pt on Black Box warning for concurrent use of opiates and benzodiazepines  RTC 3 mos or as needed    Shikha Carrera, APRN-CNP                            [1]   Patient Active Problem List  Diagnosis    Allergic rhinitis    Anxiety disorder    Arnold-Chiari syndrome without spina bifida or hydrocephalus (Multi)    Cervical spondylosis with myelopathy    Chronic musculoskeletal pain    Current smoker    Diverticulosis    Epidermal inclusion cyst    Arthralgia of multiple joints    Hip pain, bilateral    Inflammation of sacroiliac joint    Knee pain    Lower back pain    Lumbar herniated disc    Postlaminectomy syndrome of lumbar region    Cervical radiculopathy    Lumbar radiculopathy, right    Lumbar stenosis    Primary osteoarthritis of right hip    Psoriasis    S/P cervical spinal fusion    Scalp cyst    Seasonal affective disorder    Lumbar facet arthropathy    Neck pain    Other fatigue    Dyspnea    Edema    Squamous cell carcinoma of skin of scalp and neck    Chronic use of opiate drug for therapeutic purpose    Class 1 obesity with body mass index (BMI) of 30.0 to 30.9 in adult    Primary osteoarthritis of left hip    Benign essential hypertension   [2]   Past Medical History:  Diagnosis Date    Acute gastritis without bleeding 02/06/2019    Acute gastritis without hemorrhage    Diverticulitis of intestine, part unspecified, without perforation or abscess without bleeding 03/09/2017    Acute diverticulitis    Personal history of other specified conditions 04/28/2020    History of headache   [3]   Past Surgical History:  Procedure Laterality Date    ANKLE SURGERY  11/20/2013    Ankle Surgery    MOUTH SURGERY  11/20/2013    Oral Surgery Tooth Extraction    OTHER  SURGICAL HISTORY  03/23/2021    Appendectomy   [4]   Family History  Problem Relation Name Age of Onset    Genetic Disorder Mother          cardiomyopathy   [5]   Social History  Tobacco Use    Smoking status: Every Day     Current packs/day: 0.25     Types: Cigarettes    Smokeless tobacco: Never   Vaping Use    Vaping status: Never Used   Substance Use Topics    Alcohol use: Never    Drug use: Never   [6]   Allergies  Allergen Reactions    Prednisone Other    Telithromycin Rash   [7]   Current Outpatient Medications   Medication Sig Dispense Refill    ARIPiprazole (Abilify) 2 mg tablet Take 1 tablet (2 mg) by mouth once daily. 90 tablet 3    citalopram (CeleXA) 20 mg tablet Take 1 tablet (20 mg) by mouth once daily. TAKE 1 & 1/2 TABLETS BY MOUTH EVERY DAY      hydroCHLOROthiazide (Microzide) 12.5 mg tablet TAKE ONE TABLET BY MOUTH ONCE A  DAY 90 tablet 0    LORazepam (Ativan) 1 mg tablet Take one daily as needed for anxiety ,  may take a second one at least 6 hours later in the day if needed up to 5 times in a month Do not fill before June 6, 2025. 30 tablet 2    losartan (Cozaar) 25 mg tablet TAKE ONE TABLET BY MOUTH ONCE A  DAY 90 tablet 0    naloxone (Narcan) 4 mg/0.1 mL nasal spray Administer 1 spray (4 mg) into affected nostril(s) if needed for opioid reversal. 2 each 0    [START ON 6/27/2025] oxyCODONE (Roxicodone) 5 mg immediate release tablet Take 1 tablet (5 mg) by mouth 3 times a day as needed for severe pain (7 - 10) for up to 28 days. Do not fill before June 27, 2025. 84 tablet 0    [START ON 7/25/2025] oxyCODONE (Roxicodone) 5 mg immediate release tablet Take 1 tablet (5 mg) by mouth 3 times a day as needed for severe pain (7 - 10) for up to 28 days. Do not fill before July 25, 2025. 84 tablet 0    [START ON 8/22/2025] oxyCODONE (Roxicodone) 5 mg immediate release tablet Take 1 tablet (5 mg) by mouth 3 times a day as needed for severe pain (7 - 10) for up to 28 days. Do not fill before August 22, 2025.  84 tablet 0     No current facility-administered medications for this visit.

## 2025-06-25 ENCOUNTER — TELEPHONE (OUTPATIENT)
Dept: PAIN MEDICINE | Facility: CLINIC | Age: 50
End: 2025-06-25
Payer: COMMERCIAL

## 2025-06-25 NOTE — TELEPHONE ENCOUNTER
----- Message from Shikha Carrera sent at 6/25/2025  7:23 AM EDT -----  Please notify pt. He should make an appt with Dr. Gale to discuss SCS further.  TY

## 2025-06-25 NOTE — TELEPHONE ENCOUNTER
I left a detailed message on patients cell phone regarding request to make appt with Dr Gale to discuss SCS. I also offered to mail educational info if pt is intereseted

## 2025-06-27 LAB — NONINV COLON CA DNA+OCC BLD SCRN STL QL: NEGATIVE

## 2025-08-09 DIAGNOSIS — F41.1 GENERALIZED ANXIETY DISORDER: ICD-10-CM

## 2025-08-12 RX ORDER — ARIPIPRAZOLE 5 MG/1
5 TABLET ORAL DAILY
Qty: 90 TABLET | Refills: 0 | OUTPATIENT
Start: 2025-08-12

## 2025-08-29 ENCOUNTER — APPOINTMENT (OUTPATIENT)
Dept: PRIMARY CARE | Facility: CLINIC | Age: 50
End: 2025-08-29
Payer: COMMERCIAL

## 2025-09-02 ENCOUNTER — APPOINTMENT (OUTPATIENT)
Dept: PRIMARY CARE | Facility: CLINIC | Age: 50
End: 2025-09-02
Payer: COMMERCIAL

## 2025-09-02 ASSESSMENT — ENCOUNTER SYMPTOMS
LOSS OF SENSATION IN FEET: 0
DEPRESSION: 0
OCCASIONAL FEELINGS OF UNSTEADINESS: 0

## 2025-09-02 ASSESSMENT — PATIENT HEALTH QUESTIONNAIRE - PHQ9
2. FEELING DOWN, DEPRESSED OR HOPELESS: NOT AT ALL
SUM OF ALL RESPONSES TO PHQ9 QUESTIONS 1 & 2: 0
1. LITTLE INTEREST OR PLEASURE IN DOING THINGS: NOT AT ALL

## 2025-12-02 ENCOUNTER — APPOINTMENT (OUTPATIENT)
Dept: PRIMARY CARE | Facility: CLINIC | Age: 50
End: 2025-12-02
Payer: COMMERCIAL